# Patient Record
Sex: MALE | Race: WHITE | HISPANIC OR LATINO
[De-identification: names, ages, dates, MRNs, and addresses within clinical notes are randomized per-mention and may not be internally consistent; named-entity substitution may affect disease eponyms.]

---

## 2018-01-18 PROBLEM — Z00.00 ENCOUNTER FOR PREVENTIVE HEALTH EXAMINATION: Status: ACTIVE | Noted: 2018-01-18

## 2018-03-16 ENCOUNTER — APPOINTMENT (OUTPATIENT)
Dept: VASCULAR SURGERY | Facility: CLINIC | Age: 73
End: 2018-03-16
Payer: MEDICARE

## 2018-03-16 VITALS
OXYGEN SATURATION: 100 % | WEIGHT: 195 LBS | TEMPERATURE: 97.7 F | DIASTOLIC BLOOD PRESSURE: 81 MMHG | HEIGHT: 67 IN | BODY MASS INDEX: 30.61 KG/M2 | SYSTOLIC BLOOD PRESSURE: 130 MMHG | HEART RATE: 69 BPM

## 2018-03-16 DIAGNOSIS — Z83.3 FAMILY HISTORY OF DIABETES MELLITUS: ICD-10-CM

## 2018-03-16 DIAGNOSIS — Z78.9 OTHER SPECIFIED HEALTH STATUS: ICD-10-CM

## 2018-03-16 DIAGNOSIS — Z87.891 PERSONAL HISTORY OF NICOTINE DEPENDENCE: ICD-10-CM

## 2018-03-16 DIAGNOSIS — Z86.79 PERSONAL HISTORY OF OTHER DISEASES OF THE CIRCULATORY SYSTEM: ICD-10-CM

## 2018-03-16 DIAGNOSIS — I83.90 ASYMPTOMATIC VARICOSE VEINS OF UNSPECIFIED LOWER EXTREMITY: ICD-10-CM

## 2018-03-16 DIAGNOSIS — Z86.39 PERSONAL HISTORY OF OTHER ENDOCRINE, NUTRITIONAL AND METABOLIC DISEASE: ICD-10-CM

## 2018-03-16 PROCEDURE — 99204 OFFICE O/P NEW MOD 45 MIN: CPT

## 2018-03-16 RX ORDER — METFORMIN HYDROCHLORIDE 500 MG/1
500 TABLET, COATED ORAL
Refills: 0 | Status: ACTIVE | COMMUNITY

## 2018-03-16 RX ORDER — SAXAGLIPTIN 5 MG/1
TABLET, FILM COATED ORAL
Refills: 0 | Status: ACTIVE | COMMUNITY

## 2018-06-15 ENCOUNTER — APPOINTMENT (OUTPATIENT)
Dept: VASCULAR SURGERY | Facility: CLINIC | Age: 73
End: 2018-06-15

## 2018-09-19 ENCOUNTER — APPOINTMENT (OUTPATIENT)
Dept: ORTHOPEDIC SURGERY | Facility: CLINIC | Age: 73
End: 2018-09-19

## 2020-01-24 ENCOUNTER — APPOINTMENT (OUTPATIENT)
Dept: ORTHOPEDIC SURGERY | Facility: CLINIC | Age: 75
End: 2020-01-24
Payer: MEDICARE

## 2020-01-24 VITALS
WEIGHT: 190 LBS | SYSTOLIC BLOOD PRESSURE: 144 MMHG | BODY MASS INDEX: 29.82 KG/M2 | HEART RATE: 77 BPM | DIASTOLIC BLOOD PRESSURE: 82 MMHG | HEIGHT: 67 IN

## 2020-01-24 PROCEDURE — 20611 DRAIN/INJ JOINT/BURSA W/US: CPT | Mod: LT

## 2020-01-24 PROCEDURE — 99203 OFFICE O/P NEW LOW 30 MIN: CPT | Mod: 25

## 2020-01-24 PROCEDURE — 73564 X-RAY EXAM KNEE 4 OR MORE: CPT | Mod: LT

## 2020-01-24 RX ORDER — HYALURONATE SOD, CROSS-LINKED 30 MG/3 ML
30 SYRINGE (ML) INTRAARTICULAR
Qty: 1 | Refills: 0 | Status: ACTIVE | COMMUNITY
Start: 2020-01-24 | End: 1900-01-01

## 2020-01-24 NOTE — PHYSICAL EXAM
[de-identified] : Physical Examination\par General: well nourished, in no acute distress, alert and oriented to person, place and time\par Psychiatric: normal mood and affect, no abnormal movements or speech patterns\par Eyes: vision intact - glasses\par Throat: no thyromegaly\par Lymph: no enlarged nodes, no lymphedema in extremity\par Respiratory: no wheezing, no shortness of breath with ambulation\par Cardiac: no cardiac leg swelling, 2+ peripheral pulses\par Neurology: normal gross sensation in extremities to light touch\par Abdomen: soft, non-tender, tympanic, no masses\par \par Musculoskeletal Examination\par Ambulation	+ antalgic gait, + assistive devices\par \par Knee			Right			Left\par General\par      Swelling/Deformity	normal			normal	\par      Skin			varicose veins			varicose veins\par      Erythema		-			-\par      Standing Alignment	neutral			varus mild correctable\par      Effusion		none			trace\par Range of Motion\par      Hip			full painless ROM		full painless ROM\par      Knee Flexion		120			110\par      Knee Extension	0			0\par Patella\par      J Sign		-			-\par      Quad Medial/Lateral	1/1 1/1\par      Apprehension		-			-\par      Surjit's		-			+\par      Grind Sign		-			+\par      Crepitus		-			+\par Palpation\par      Medial Joint Line	-			+\par      Medial Fem Condyle	-			-\par      Lateral Joint Line	-			-\par      Quad Tendon		-			-\par      Patella Tendon	-			-\par      Medial Patella		-			-\par      Lateral Patella 	-			-\par      Posterior Knee	-			+\par Ligamentous\par      Varus @ 0° / 30°	-/-			-/-\par      Valgus @ 0° / 30°	-/-			-/-\par Strength Examination/Atrophy\par      Hip Flexors 		5+			5+\par      Quadriceps		5+			5+\par      Hamstring		5+			5+\par      Tibialis Anterior	5+			5+\par      Achilles/Soleus	5+			5+\par Sensation\par      Deep Peroneal	normal			normal\par      Superficial Peroneal 	normal			normal\par      Sural  		normal			normal\par      Posterior Tibial 	normal			normal\par      Saphneous 		normal			normal\par Pulses\par      DP			2+			2+\par  [de-identified] : 5 views of the affected left knee (standing AP, flexing standing AP, 30degree flexed lateral, 0degree lateral, sunrise view)\par were ordered, obtained and evaluated by myself today and\par demonstrate:\par There is severe medial weightbearing asymmetric narrowing\par Small osteophytic lipping\par Trace suprapatellar effusion\par Mild lateral patellofemoral joint space loss without evidence of tilt [or] subluxation on sunrise view\par Normal soft tissue density\par Otherwise normal osseous bone structure without fracture or dislocation

## 2020-01-24 NOTE — HISTORY OF PRESENT ILLNESS
[de-identified] : CC Left knee\par \par HPI 75 yo male presents with [chronic] onset of many years of activity related pain in the medial Left knee [without injury]. The pain is [worse], and rated a 8 out of 10, described as pain, [without radiation]. [Rest] makes the pain better and [walking standing stairs] makes the pain worse. The patient reports associated symptoms of pain. The patient - pain at night affecting sleep, and + similar pain previously treated with gel injections until the DrStefano stopped given them.\par \par The patient has tried the following treatments:\par Activity modification	+\par Ice/Compression  	+\par Braces    		+\par Nsaids    		+\par Physical Therapy 	-\par Cortisone Injection	+ no improvement\par Visco Injection		+ some improvement last injection 3 years and\par Arthroscopy		-\par \par Review of Systems is positive for the above musculoskeletal symptoms and is otherwise non-contributory for general, constitutional, psychiatric, neurologic, HEENT, cardiac, respiratory, gastrointestinal, reproductive, lymphatic, and dermatologic complaints.\par \par Consult by Dr Horton\par \par

## 2020-01-24 NOTE — DISCUSSION/SUMMARY
[de-identified] : Left knee osteoarthritis\par \par \par Patient is not interested in surgical management\par The patient and I discussed the causes and progression of degenerative joint disease of the knee. Models, diagrams and drawings were used in the discussion. Treatment can include conservative non-operative management and surgical options. Conservative management includes weight loss, activity modification, physical therapy to improve motion and strength in the muscles around the knee and the body's core, PO and topical NSAIDs, corticosteroid and/or viscosupplementation intra-articular injections. If the patient fails to improve with non-operative management, surgical management is possible. Depending upon the patient's age, BMI, activity level, degree and location of arthrosis different surgical options are possible including arthroscopic debridement with chondroplasty, high-tibial osteotomy, unicondylar/partial arthroplasty, and total joint arthroplasty.\par \par Patient declined physical therapy\par Patient declined topical anti-inflammatory cream\par Patient is uninterested in surgical management\par \par Procedure Note:\par \par Verbal consent was obtained for an arthrocentesis and intra-articular ketorolac injection of the Left knee, after the risks and benefits were discussed with the patient. Potential adverse effects were discussed including but not limited to bleeding, skin/joint infection, local skin reactions including bleaching, bruising, stiffness, soreness, vasovagal episodes, transient hyperglycemia, avascular necrosis, pseudo-septic type reactions, post injection joint pain, allergic reaction to product or anesthetic and other rare but potential adverse effects along with benefits including decreased pain and improved stability prior to obtaining verbal informed consent. It was also discussed that for some patients the treatment is ineffective and there are no guarantees that the patient will experience improvement as the result of the injection. In rare occasions the injection can cause worsening of pain.\par \par The use of a Sonosite 15-6 MHz linear transducer with live ultrasound guidance of the knee was necessary given the patient's BMI and local body habitus overlying and obscuring the accurate identification of normal body bony anatomy used to identify the injection site and the depth of soft tissue envelope necessitating a longer than normal needle to reach the joint space, and to confirm the location of the needle tip and intra-articular delivery of the medication. Without the use of live ultrasound guidance the injection would have been more difficult and place the patient's neurovascular structures at risk from the longer needle needed to traverse the soft tissue envelope.\par \par A 6 inch bolster was placed underneath the knee to place the knee in a slightly flexed position. The superolateral injection site was identified using the ultrasound probe to identify the suprapatellar space and superior boarder of the patella first longitudinally and then transversely. The injection site was marked and prepped with a ChloraPrep swab and anesthetized with ethylchloride skin anesthesia. Using sterile technique a 20g 3 1/2 in spinal needle with 4 cc total of 1cc 1% lidocaine without epinephrine, 1cc 0.25% Marcaine without epinephrine and 2cc of 60mg/mL Ketorolac was passed through the injection site towards the suprapatellar space under live ultrasound guidance and noted to penetrate the joint capsule. The medication was injected without resistance under live ultrasound visualization and noted to flow into the suprapatellar joint space. The injection site was sterilely dressed, there was minimal blood loss. The patient tolerated this procedure without any complications done by myself. Images were recorded and saved.\par \par The patient has been advised that if they notice any worsening of symptoms or any problems to contact me and seek care from a qualified medical professional. The patient was instructed to ice the knee and take NSAID medication on an as needed basis if the patient feels discomfort.\par \par Due to failure of prior non-operative modalities of treatment including physical therapy, activity modification, non-steroidal anti-inflammatory medications, and weight-loss with inability to give corticosteroid injection due to the patient's diabetic status over concern for unsafe rise in blood glucose causing injury to the patient, I am ordering a viscosupplementation injection Gel 1 for the left knee and will obtain insurance authorization. The patient will be contacted by our authorization department over its status, copayment and when available for injection.\par \par The patient verifies their understanding the the visit, diagnosis and plan. They agree with the treatment plan and will contact the office with any questions or problems.\par Follow up\par PRN

## 2022-04-26 ENCOUNTER — APPOINTMENT (OUTPATIENT)
Dept: ORTHOPEDIC SURGERY | Facility: CLINIC | Age: 77
End: 2022-04-26

## 2022-04-29 ENCOUNTER — APPOINTMENT (OUTPATIENT)
Dept: ORTHOPEDIC SURGERY | Facility: CLINIC | Age: 77
End: 2022-04-29
Payer: MEDICARE

## 2022-04-29 ENCOUNTER — NON-APPOINTMENT (OUTPATIENT)
Age: 77
End: 2022-04-29

## 2022-04-29 VITALS — WEIGHT: 195 LBS | BODY MASS INDEX: 30.61 KG/M2 | HEIGHT: 67 IN

## 2022-04-29 PROCEDURE — 20610 DRAIN/INJ JOINT/BURSA W/O US: CPT | Mod: LT

## 2022-04-29 PROCEDURE — 99214 OFFICE O/P EST MOD 30 MIN: CPT | Mod: 25

## 2022-04-29 RX ORDER — HYALURONATE SOD, CROSS-LINKED 30 MG/3 ML
30 SYRINGE (ML) INTRAARTICULAR
Qty: 1 | Refills: 0 | Status: ACTIVE | COMMUNITY
Start: 2022-04-29

## 2022-04-29 NOTE — HISTORY OF PRESENT ILLNESS
[de-identified] : Patient is here for left knee pain. This has been a chronic issue. He states that he had arthroscopic surgery on it about 5 years ago. He was treated with HA injections i the pat which provided relief, that was about 3 years ago. There has been no recent injury. He was seen by his PCP who sent him for xrays and referred him here. He has been to PT. He takes ASA occasionally for pain. He is retired. \par \par The patient's past medical history, past surgical history, medications and allergies were reviewed by me today and documented accordingly. In addition, the patient's family and social history, which were noncontributory to this visit, were reviewed also. Intake form was reviewed. The patient has no family history of arthritis.

## 2022-04-29 NOTE — PHYSICAL EXAM
[de-identified] : Constitutional: Well-nourished, well-developed, No acute distress\par Respiratory:  Good respiratory effort, no SOB\par Lymphatic: No regional lymphadenopathy, no lymphedema\par Psychiatric: Pleasant and normal affect, alert and oriented x3\par Musculoskeletal: normal except where as noted in regional exam\par \par Bilateral knees:\par APPEARANCE: + enlargement of the distal femur and proximal tibia, varus deformity, no swelling\par POSITIVE TENDERNESS:  Distal femur, proximal tibia, medial jt line/retinaculum, and lateral jt line/retinaculum\par NONTENDER: patellar & quadriceps tendons, MCL/LCL, ITB at the lateral femoral condyle & Gerdy's tubercle, pes bursa. \par ROM: full extension, limited flexion to 115° due to stiffness and pain. \par RESISTIVE TESTING: painless resisted knee flex/ext, although + crepitus felt in anterior knee. \par SPECIAL TESTS: stable v/v stress. painless grind. neg ant/post drawer. + Sandeep's for pain in medial and lateral joint line. \par  [de-identified] : I reviewed, interpreted and clinically correlated the following outside imaging studies,\par Outside x-rays, 3 views of the bilateral knees, evidence of severe tricompartmental osteoarthritis, with near bone-on-bone contact of the medial compartment of the right knee, and bone-on-bone contact of the medial compartment of the left knee.\par _____________\par \par Date of Exam: 04-\par  \par EXAM:  X-RAY BILATERAL KNEES 3 VIEWS EACH\par \par HISTORY:  Bilateral knee pain with no trauma\par \par TECHNIQUE: 3 radiographic views of the bilateral knee were obtained.\par \par COMPARISON:  Correlation made with left knee x-ray dated 12/26/2019.\par \par FINDINGS:  \par Right knee demonstrates tricompartment osteoarthritis most severe medially. Subchondral sclerosis and osteophyte formation. No erosive changes. Small suprapatellar enthesophyte. Small effusion and extensive vascular disease.\par \par Left knee demonstrates severe degenerative narrowing with bone-on-bone medially. Moderate degenerative changes involving the patellofemoral joint and mild degenerative changes involving the lateral joint space. Subchondral sclerosis and marginal osteophytes. Small effusion. Diffuse vascular disease.\par \par \par \par IMPRESSION: \par 1. Bilateral osteoarthritis greater on left than right with progression.

## 2022-04-29 NOTE — DISCUSSION/SUMMARY
[de-identified] : Discussed findings of today's exam and possible causes of patient's pain.  Educated patient on their most probable diagnosis of chronic intermittent left knee pain with recent atraumatic exacerbation due to underlying severe osteoarthritis.  Reviewed possible courses of treatment, and we collaboratively decided best course of treatment at this time will include conservative management.  I reviewed the patient's outpatient x-rays which shows he has severe osteoarthritis, it is more significant in the left knee than the right knee, he has bone-on-bone contact of the medial compartment of the left knee.  His left knee is a symptomatic knee at this time, he is not having any pain in the right knee currently.  We discussed various treatment options as well as associated risk/benefits/alternatives and patient elected to proceed with left knee cortisone injection today (see procedure note).  Informed the patient that the numbing medicine in today's injection will last for about 4-6 hours. The steroid that was injected will start to work in 1 to 2 days, peak at 1-2 weeks, and may last up to 1-2 months.  Patient will be started on a course of physical therapy to restore normal range of motion and strength as tolerated.  We also discussed utilization of hyaluronic acid injection therapy as a potentially more long-term and beneficial treatment option, patient would like to proceed with insurance authorization at this time.  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  Patient appreciates and agrees with current plan.\par \par I work as part of an academic orthopedic group and routinely have a physician in training (resident / fellow) working with me.  Any part of the history and physical exam performed by the physician in training was either directly reviewed and/or replicated by myself.  Any procedure performed by the physician in training was performed under my direct supervision and with the consent of the patient.\par \par This note was generated using dragon medical dictation software.  A reasonable effort has been made for proofreading its contents, but typos may still remain.  If there are any questions or points of clarification needed please notify my office.

## 2022-04-29 NOTE — CONSULT LETTER
[Dear  ___] : Dear  [unfilled], [Consult Letter:] : I had the pleasure of evaluating your patient, [unfilled]. [Please see my note below.] : Please see my note below. [Consult Closing:] : Thank you very much for allowing me to participate in the care of this patient.  If you have any questions, please do not hesitate to contact me. [Sincerely,] : Sincerely, [FreeTextEntry2] : [id number] [place] [address] [FreeTextEntry3] : Tariq Manley DO, ATC\par Primary Care Sports Medicine\par [facility] [place]

## 2022-04-29 NOTE — PROCEDURE
[de-identified] : Injection: Left knee joint.\par Indication: Osteoarthritis.\par \par A discussion was had with the patient regarding this procedure and all questions were answered. All risks, benefits and alternatives were discussed. These included but were not limited to bleeding, infection, allergic reaction and reaccumulation of fluid. A timeout was done to ensure correct side and patient agreed to the procedure.  A [place] yimi was created on the skin utilizing a plastic needle cap to yimi the anticipated point of entry.  Alcohol was used to clean the skin, and betadine was used to sterilize and prep the area in the lateral joint line aspect of the knee. Ethyl chloride spray was then used as a topical anesthetic. A 22-gauge needle was used to inject 2cc of 0.25% bupivacaine and 1cc of 40mg/ml methylprednisolone into the knee. A sterile bandage was then applied. The patient tolerated the procedure well.

## 2022-05-02 ENCOUNTER — FORM ENCOUNTER (OUTPATIENT)
Age: 77
End: 2022-05-02

## 2022-06-10 ENCOUNTER — APPOINTMENT (OUTPATIENT)
Dept: ORTHOPEDIC SURGERY | Facility: CLINIC | Age: 77
End: 2022-06-10
Payer: MEDICARE

## 2022-06-10 PROCEDURE — 20610 DRAIN/INJ JOINT/BURSA W/O US: CPT | Mod: LT

## 2022-06-10 NOTE — PHYSICAL EXAM
[de-identified] : Constitutional: Well-nourished, well-developed, No acute distress\par Respiratory: Good respiratory effort, no SOB\par Lymphatic: No regional lymphadenopathy, no lymphedema\par Psychiatric: Pleasant and normal affect, alert and oriented x3\par Musculoskeletal: normal except where as noted in regional exam\par \par Bilateral knees:\par APPEARANCE: + enlargement of the distal femur and proximal tibia, varus deformity, no swelling\par POSITIVE TENDERNESS: Distal femur, proximal tibia, medial jt line/retinaculum, and lateral jt line/retinaculum\par NONTENDER: patellar & quadriceps tendons, MCL/LCL, ITB at the lateral femoral condyle & Gerdy's tubercle, pes bursa. \par ROM: full extension, limited flexion to 115° due to stiffness and pain. \par RESISTIVE TESTING: painless resisted knee flex/ext, although + crepitus felt in anterior knee. \par SPECIAL TESTS: stable v/v stress. painless grind. neg ant/post drawer. + Sandeep's for pain in medial and lateral joint line. \par

## 2022-06-10 NOTE — DISCUSSION/SUMMARY
[de-identified] : Patient was seen today for continued management of chronic knee pain secondary to underlying osteoarthritis. This has been a chronic issue for the patient with recent atraumatic exacerbation. We discussed various treatment options as well as associated risk/benefits/alternatives and patient elected to proceed with hyaluronic acid injection therapy. \par A single Gel One injection was given today under sterile conditions into the left knee joint without complication (see procedure note). I discussed the effects of this medication and how long it may provide benefit. If no significant long-term benefit, the patient may elect for additional treatment strategies as previously discussed. However, if the patient obtains good relief of symptoms, the injection therapy series can be repeated over the next 6-12 months.\par \par Will have the patient followup on an as-needed basis at this time.  Patient appreciates and agrees with current plan.\par \par I work as part of an academic orthopedic group and routinely have a physician in training (resident / fellow) working with me.  Any part of the history and physical exam performed by the physician in training was either directly reviewed and/or replicated by myself.  Any procedure performed by the physician in training was performed under my direct supervision and with the consent of the patient.\par \par This note was generated using dragon medical dictation software. A reasonable effort has been made for proofreading its contents, but typos may still remain. If there are any questions or points of clarification needed please notify my office.

## 2022-06-10 NOTE — HISTORY OF PRESENT ILLNESS
[de-identified] : Patient has a history of knee osteoarthritis.  We discussed treatment options and have obtained insurance authorization for a hyaluronic acid injection and patient would like to proceed at this time.  No significant interval change in knee pain since last evaluated.

## 2022-06-10 NOTE — PROCEDURE
[de-identified] : Injection: Left knee joint.\par Indication: Osteoarthritis.\par \par A discussion was had with the patient regarding this procedure and all questions were answered. All risks, benefits and alternatives were discussed. These included but were not limited to bleeding, infection, and allergic reaction. A timeout was done to ensure correct side and patient agreed to the procedure.  A [place] yimi was created on the skin utilizing a plastic needle cap to yimi the anticipated point of entry. Alcohol was used to clean the skin, and betadine was used to sterilize and prep the area in the lateral joint line aspect of the knee. Ethyl chloride spray was then used as a topical anesthetic. A 20-gauge needle was used to inject 3 cc of Gel One into the knee with ease. A sterile bandage was then applied. The patient tolerated the procedure well and there were no complications. \par \par Lot #:  [id number]\par Exp:  8/30/23\par

## 2022-08-23 ENCOUNTER — APPOINTMENT (OUTPATIENT)
Dept: GASTROENTEROLOGY | Facility: CLINIC | Age: 77
End: 2022-08-23

## 2022-08-23 VITALS
TEMPERATURE: 97.4 F | SYSTOLIC BLOOD PRESSURE: 157 MMHG | OXYGEN SATURATION: 97 % | BODY MASS INDEX: 29.35 KG/M2 | HEIGHT: 67 IN | DIASTOLIC BLOOD PRESSURE: 73 MMHG | HEART RATE: 87 BPM | WEIGHT: 187 LBS

## 2022-08-23 DIAGNOSIS — K62.5 HEMORRHAGE OF ANUS AND RECTUM: ICD-10-CM

## 2022-08-23 PROCEDURE — 99203 OFFICE O/P NEW LOW 30 MIN: CPT

## 2022-08-23 RX ORDER — SODIUM SULFATE, POTASSIUM SULFATE, MAGNESIUM SULFATE 17.5; 3.13; 1.6 G/ML; G/ML; G/ML
17.5-3.13-1.6 SOLUTION, CONCENTRATE ORAL
Qty: 1 | Refills: 0 | Status: ACTIVE | COMMUNITY
Start: 2022-08-23 | End: 1900-01-01

## 2022-08-24 LAB — HEMOCCULT STL QL: NEGATIVE

## 2022-08-25 NOTE — ASSESSMENT
[FreeTextEntry1] : This is a 76 year old male here for an evaluation of FOBT +. \par \par Mt plan \par -EGD and colonoscopy same day \par -Suprep\par -Covid swab\par -cbc\par \par Risks, benefits, and alternatives of EGD and colonoscopy discussed at length with patient including but not limited to bleeding , perforation, anesthesia related complication, aspiration, etc. Patient expressed understanding.\par \par EGD/Colonoscopy for evaluation of polyps, tumors, nodules with +/- biopsy and or cauterization w/ and or w/o clipping. \par

## 2022-08-25 NOTE — PHYSICAL EXAM
[General Appearance - Alert] : alert [General Appearance - In No Acute Distress] : in no acute distress [Bowel Sounds] : normal bowel sounds [Abdomen Soft] : soft [Abdomen Tenderness] : non-tender [] : no hepato-splenomegaly [Abdomen Mass (___ Cm)] : no abdominal mass palpated [External Hemorrhoid] : external hemorrhoids [No CVA Tenderness] : no ~M costovertebral angle tenderness [No Spinal Tenderness] : no spinal tenderness [Abnormal Walk] : normal gait [Nail Clubbing] : no clubbing  or cyanosis of the fingernails [Musculoskeletal - Swelling] : no joint swelling seen [Motor Tone] : muscle strength and tone were normal [Deep Tendon Reflexes (DTR)] : deep tendon reflexes were 2+ and symmetric [Sensation] : the sensory exam was normal to light touch and pinprick [No Focal Deficits] : no focal deficits [Oriented To Time, Place, And Person] : oriented to person, place, and time [Impaired Insight] : insight and judgment were intact [Affect] : the affect was normal [FreeTextEntry1] : hemorrhoid at 6 O'clock

## 2022-08-25 NOTE — HISTORY OF PRESENT ILLNESS
[de-identified] : This is a 76 year old male here for an evaluation of FOBT +. PMH of DM, HTN, inguinal hernia repair. Denies a family history of colon CA, gastric CA, high risk polyps, Inflammatory and autoimmune disease. Patient denies any difficulty swallowing or reflux. No N&V or abdominal pain. Remote history of normal  EGD. Stool caliber normal. Reports BRBPR over the past several months. Not constipated. No rectal pain. Last colonoscopy 20 years ago with benign polyps. \par Smoke: quit 20 years ago\par Etoh: quit 20 years ago

## 2022-09-01 RX ORDER — POLYETHYLENE GLYCOL-3350 AND ELECTROLYTES 236; 6.74; 5.86; 2.97; 22.74 G/274.31G; G/274.31G; G/274.31G; G/274.31G; G/274.31G
236 POWDER, FOR SOLUTION ORAL
Qty: 1 | Refills: 0 | Status: ACTIVE | COMMUNITY
Start: 2022-09-01 | End: 1900-01-01

## 2022-09-12 ENCOUNTER — APPOINTMENT (OUTPATIENT)
Dept: GASTROENTEROLOGY | Facility: HOSPITAL | Age: 77
End: 2022-09-12

## 2022-09-12 ENCOUNTER — OUTPATIENT (OUTPATIENT)
Dept: OUTPATIENT SERVICES | Facility: HOSPITAL | Age: 77
LOS: 1 days | End: 2022-09-12
Payer: COMMERCIAL

## 2022-09-12 ENCOUNTER — RESULT REVIEW (OUTPATIENT)
Age: 77
End: 2022-09-12

## 2022-09-12 VITALS
DIASTOLIC BLOOD PRESSURE: 69 MMHG | OXYGEN SATURATION: 97 % | HEART RATE: 69 BPM | SYSTOLIC BLOOD PRESSURE: 130 MMHG | RESPIRATION RATE: 16 BRPM

## 2022-09-12 VITALS
SYSTOLIC BLOOD PRESSURE: 133 MMHG | HEIGHT: 67 IN | RESPIRATION RATE: 19 BRPM | WEIGHT: 190.04 LBS | OXYGEN SATURATION: 99 % | DIASTOLIC BLOOD PRESSURE: 67 MMHG | HEART RATE: 81 BPM | TEMPERATURE: 98 F

## 2022-09-12 DIAGNOSIS — Z98.890 OTHER SPECIFIED POSTPROCEDURAL STATES: Chronic | ICD-10-CM

## 2022-09-12 DIAGNOSIS — K62.5 HEMORRHAGE OF ANUS AND RECTUM: ICD-10-CM

## 2022-09-12 LAB — SARS-COV-2 N GENE NPH QL NAA+PROBE: NOT DETECTED

## 2022-09-12 PROCEDURE — 45385 COLONOSCOPY W/LESION REMOVAL: CPT

## 2022-09-12 PROCEDURE — C1889: CPT

## 2022-09-12 PROCEDURE — 43239 EGD BIOPSY SINGLE/MULTIPLE: CPT

## 2022-09-12 PROCEDURE — 88305 TISSUE EXAM BY PATHOLOGIST: CPT

## 2022-09-12 PROCEDURE — 88312 SPECIAL STAINS GROUP 1: CPT | Mod: 26

## 2022-09-12 PROCEDURE — 88312 SPECIAL STAINS GROUP 1: CPT

## 2022-09-12 PROCEDURE — 45380 COLONOSCOPY AND BIOPSY: CPT | Mod: 59

## 2022-09-12 PROCEDURE — 88305 TISSUE EXAM BY PATHOLOGIST: CPT | Mod: 26

## 2022-09-12 RX ORDER — SODIUM CHLORIDE 9 MG/ML
500 INJECTION INTRAMUSCULAR; INTRAVENOUS; SUBCUTANEOUS
Refills: 0 | Status: DISCONTINUED | OUTPATIENT
Start: 2022-09-12 | End: 2022-09-26

## 2022-09-12 NOTE — ASU PATIENT PROFILE, ADULT - FALL HARM RISK - UNIVERSAL INTERVENTIONS
Bed in lowest position, wheels locked, appropriate side rails in place/Call bell, personal items and telephone in reach/Instruct patient to call for assistance before getting out of bed or chair/Non-slip footwear when patient is out of bed/[place] to call system/Physically safe environment - no spills, clutter or unnecessary equipment/Purposeful Proactive Rounding/Room/bathroom lighting operational, light cord in reach

## 2022-09-12 NOTE — ASU PATIENT PROFILE, ADULT - PRESSURE ULCER(S)
Subjective:    Patient ID:  Anju Rivera is a 89 y.o. female who presents for evaluation of Hypertension, Hyperlipidemia, Coronary Artery Disease, Congestive Heart Failure, and Shortness of Breath        HPI Pt returns for f/u.  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   Negative stress mpi 2/16.  Echo 2/16 showed normal LVEF, mild AS, mild PHTN.   She saw Dr. Jackson, [facility] surgery, 2019 for her PAD, with med mgt advised at her advanced frail age.  Echo 9/18 normal LV function, LVH, mild-mod AS.  ecg 9/28/20 NSR, LVH.  No acute changes.  Now here.  Pt recently put on short course of Lasix by PCP for dyspnea, CHF.  She called requesting refill.  BNP was much higher than past values.  BNP 1123 on 12/1/20 lab.  She is weak, frail at her advanced age and on home O2 for years.  Has fall risk issues.  Has chronic dyspnea but gets worse at times.  Has chronic cough.  Uses her inhalers.  S/p doxycycline course few days ago.  BP elevated.  No typical angina.  Has some atypical cp with deep breathing.   No foot ulcers.  F/u by digital HTN program.   CXR 12/1/20 small pleural effusions.        Current Outpatient Medications:     albuterol (VENTOLIN HFA) 90 mcg/actuation inhaler, Inhale 2 puffs into the lungs every 4 (four) hours as needed for Wheezing., Disp: 18 g, Rfl: 3    albuterol-ipratropium (DUO-NEB) 2.5 mg-0.5 mg/3 mL nebulizer solution, USE 1 VIAL VIA NEBULIZER EVERY 6 TO 8 HOURS AS NEEDED FOR WHEEZING, Disp: 360 mL, Rfl: 6    allopurinoL (ZYLOPRIM) 100 MG tablet, Take 2 tablets (200 mg total) by mouth once daily., Disp: 180 tablet, Rfl: 1    aspirin (ECOTRIN) 81 MG EC tablet, Take 81 mg by mouth once daily., Disp: , Rfl:     bimatoprost (LUMIGAN) 0.03 % ophthalmic  drops, Place 1 drop into both eyes nightly., Disp: 2.5 mL, Rfl: 12    bisacodyl (DULCOLAX) 5 mg EC tablet, Take 5 mg by mouth daily as needed for Constipation., Disp: , Rfl:     CARBOXYMETHYLCELLULOSE SODIUM (REFRESH OPHT), Apply 1 drop to eye daily as needed. , Disp: , Rfl:     citalopram (CELEXA) 40 MG tablet, TAKE ONE TABLET BY MOUTH ONE TIME DAILY, Disp: 90 tablet, Rfl: 2    clonazePAM (KLONOPIN) 0.5 MG tablet, Take 1 tablet (0.5 mg total) by mouth 3 (three) times daily., Disp: 90 tablet, Rfl: 1    doxycycline (VIBRA-TABS) 100 MG tablet, Take 1 tablet (100 mg total) by mouth 2 (two) times daily. for 14 days, Disp: 28 tablet, Rfl: 0    facial-body wipes (CLEANSING EYELID WIPES TOP), , Disp: , Rfl:     ferrous sulfate (IRON, FERROUS SULFATE,) 325 mg (65 mg iron) Tab tablet, , Disp: , Rfl:     fluticasone furoate-vilanterol (BREO ELLIPTA) 100-25 mcg/dose diskus inhaler, Inhale 1 puff into the lungs once daily., Disp: 1 each, Rfl: 11    furosemide (LASIX) 20 MG tablet, Take 1 tablet (20 mg total) by mouth once daily., Disp: 30 tablet, Rfl: 12    HYDROcodone-acetaminophen (NORCO) 5-325 mg per tablet, Take 1/2 to 1 tablet one time during the the day as needed and 1 1/2 tablet at night, Disp: 75 tablet, Rfl: 0    levothyroxine (SYNTHROID) 50 MCG tablet, Take 1 tablet (50 mcg total) by mouth once daily., Disp: 90 tablet, Rfl: 1    losartan (COZAAR) 50 MG tablet, Take 1 tablet (50 mg total) by mouth once daily., Disp: 90 tablet, Rfl: 4    LUMIGAN 0.01 % Drop, Place 1 drop into both eyes every evening., Disp: 2.5 mL, Rfl: 11    MULTIVITAMIN, Take 1 tablet by mouth Daily., Disp: , Rfl:     OXYGEN-AIR DELIVERY SYSTEMS MISC, 2 L by Misc.(Non-Drug; Combo Route) route. Per min, Disp: , Rfl:     potassium chloride SA (K-DUR,KLOR-CON) 10 MEQ tablet, Take 1 tablet (10 mEq total) by mouth once daily., Disp: 30 tablet, Rfl: 12    psyllium (METAMUCIL) powder, Take 1 packet by mouth once daily., Disp: , Rfl:      "UNKNOWN TO PATIENT, Cough syrup as needed, Disp: , Rfl:       Review of Systems   Constitution: Positive for malaise/fatigue.   HENT: Negative.    Eyes: Negative.    Cardiovascular: Positive for chest pain, dyspnea on exertion and leg swelling.   Respiratory: Positive for cough and shortness of breath.    Endocrine: Negative.    Hematologic/Lymphatic: Negative.    Skin: Negative.    Musculoskeletal: Positive for arthritis.   Gastrointestinal: Negative.    Genitourinary: Negative.    Neurological: Positive for weakness.   Psychiatric/Behavioral: Negative.    Allergic/Immunologic: Negative.        BP (!) 170/90 (BP Location: Left arm, Patient Position: Sitting, BP Method: Medium (Manual))   Pulse 69   Ht 5' 3" (1.6 m)   SpO2 98%   BMI 14.70 kg/m²     Wt Readings from Last 3 Encounters:   12/10/20 37.6 kg (83 lb)   12/01/20 38.1 kg (83 lb 15.9 oz)   11/24/20 36 kg (79 lb 5.9 oz)     Temp Readings from Last 3 Encounters:   12/10/20 98.8 °F (37.1 °C) (Temporal)   11/24/20 100 °F (37.8 °C) (Tympanic)   09/18/20 99.1 °F (37.3 °C) (Tympanic)     BP Readings from Last 3 Encounters:   12/14/20 (!) 170/90   12/10/20 (!) 162/82   12/01/20 (!) 160/80     Pulse Readings from Last 3 Encounters:   12/14/20 69   12/10/20 78   12/01/20 75          Objective:    Physical Exam   Constitutional: She is oriented to person, place, and time. Vital signs are normal. She appears well-developed. She appears cachectic. She is cooperative. She has a sickly appearance. She appears ill. No distress.   HENT:   Head: Normocephalic.   Neck: Neck supple. No JVD present. Carotid bruit is not present. No thyromegaly present.   Cardiovascular: Normal rate, regular rhythm, S1 normal, S2 normal and normal pulses. PMI is not displaced. Exam reveals no gallop and no friction rub.   Murmur heard.   Medium-pitched midsystolic murmur is present at the lower left sternal border.  Pulses:       Radial pulses are 2+ on the right side and 2+ on the left side. "   Pulmonary/Chest: Effort normal. She has no wheezes. She has rhonchi. She has no rales.   Abdominal: Soft. Normal appearance and bowel sounds are normal. She exhibits no abdominal bruit. There is no abdominal tenderness.   Musculoskeletal:         General: Edema present.   Lymphadenopathy:     She has no cervical adenopathy.   Neurological: She is alert and oriented to person, place, and time.   Skin: Skin is warm. She is not diaphoretic.   Psychiatric: She has a normal mood and affect. Her behavior is normal.   Nursing note and vitals reviewed.      I have reviewed all pertinent labs and cardiac studies.      Component      Latest Ref Rng & Units 12/1/2020   BNP      0 - 99 pg/mL 1,123 (H)         Chemistry        Component Value Date/Time     09/28/2020 1142    K 4.4 09/28/2020 1142     09/28/2020 1142    CO2 25 09/28/2020 1142    BUN 44 (H) 09/28/2020 1142    CREATININE 1.4 09/28/2020 1142    GLU 57 (L) 09/28/2020 1142        Component Value Date/Time    CALCIUM 9.2 09/28/2020 1142    ALKPHOS 76 09/28/2020 1142    AST 26 09/28/2020 1142    ALT 13 09/28/2020 1142    BILITOT 0.2 09/28/2020 1142    ESTGFRAFRICA 37.9 (A) 09/28/2020 1142    EGFRNONAA 32.9 (A) 09/28/2020 1142        Lab Results   Component Value Date    WBC 6.03 12/07/2020    HGB 9.4 (L) 12/07/2020    HCT 31.6 (L) 12/07/2020     (H) 12/07/2020     12/07/2020       Lab Results   Component Value Date    HGBA1C 5.5 03/17/2008     Lab Results   Component Value Date    CHOL 190 04/29/2019    CHOL 189 04/17/2018    CHOL 179 08/01/2016     Lab Results   Component Value Date    HDL 84 (H) 04/29/2019    HDL 80 (H) 04/17/2018    HDL 61 08/01/2016     Lab Results   Component Value Date    LDLCALC 98.2 04/29/2019    LDLCALC 100.6 04/17/2018    LDLCALC 96.2 08/01/2016     Lab Results   Component Value Date    TRIG 39 04/29/2019    TRIG 42 04/17/2018    TRIG 109 08/01/2016     Lab Results   Component Value Date    CHOLHDL 44.2 04/29/2019     CHOLHDL 42.3 04/17/2018    CHOLHDL 34.1 08/01/2016           Assessment:       1. Chronic diastolic heart failure    2. Abnormal ECG    3. Aortic valve disorder    4. Bilateral carotid artery stenosis    5. Coronary artery disease of native artery of native heart with stable angina pectoris    6. Essential hypertension    7. HX of MI    8. Nonrheumatic mitral valve regurgitation    9. Mixed hyperlipidemia    10. Nonrheumatic aortic valve stenosis    11. Peripheral vascular disease    12. Pulmonary hypertension    13. PVC's (premature ventricular contractions)    14. Shortness of breath    15. Pulmonary emphysema, unspecified emphysema type    16. Acute heart failure, unspecified heart failure type         Plan:             Worsening diastolic CHF sxs.  Add back Lasix 20 mg qd.  Potassium chloride 10 meq qd.  CMP today or tomorrow.  Repeat CMP + BNP 2 weeks.  Cardiac low salt diet.  Med tx for CAD/PAD.  Frail condition -- not candidate for invasive cardiac workup.  Conservative med mgt advised.  Continue home O2/pulmonary f/u.  Echocardiogram.  Phone review.  F/u 6 weeks.          no

## 2022-09-14 LAB — SURGICAL PATHOLOGY STUDY: SIGNIFICANT CHANGE UP

## 2022-09-28 DIAGNOSIS — A04.8 OTHER SPECIFIED BACTERIAL INTESTINAL INFECTIONS: ICD-10-CM

## 2022-09-28 PROBLEM — I10 ESSENTIAL (PRIMARY) HYPERTENSION: Chronic | Status: ACTIVE | Noted: 2022-09-12

## 2022-09-28 RX ORDER — BISMUTH SUBSALICYLATE 262 MG
262 TABLET,CHEWABLE ORAL 4 TIMES DAILY
Qty: 8 | Refills: 1 | Status: ACTIVE | COMMUNITY
Start: 2022-09-28 | End: 1900-01-01

## 2022-09-28 RX ORDER — OMEPRAZOLE 20 MG/1
20 CAPSULE, DELAYED RELEASE ORAL TWICE DAILY
Qty: 28 | Refills: 0 | Status: ACTIVE | COMMUNITY
Start: 2022-09-28 | End: 1900-01-01

## 2022-09-28 RX ORDER — DOXYCYCLINE HYCLATE 100 MG/1
100 CAPSULE ORAL
Qty: 28 | Refills: 0 | Status: ACTIVE | COMMUNITY
Start: 2022-09-28 | End: 1900-01-01

## 2022-09-28 RX ORDER — METRONIDAZOLE 250 MG/1
250 TABLET ORAL EVERY 6 HOURS
Qty: 56 | Refills: 0 | Status: ACTIVE | COMMUNITY
Start: 2022-09-28 | End: 1900-01-01

## 2022-10-28 ENCOUNTER — APPOINTMENT (OUTPATIENT)
Dept: VASCULAR SURGERY | Facility: CLINIC | Age: 77
End: 2022-10-28

## 2022-10-28 VITALS
WEIGHT: 190 LBS | DIASTOLIC BLOOD PRESSURE: 82 MMHG | HEIGHT: 67 IN | SYSTOLIC BLOOD PRESSURE: 137 MMHG | HEART RATE: 65 BPM | BODY MASS INDEX: 29.82 KG/M2

## 2022-10-28 PROCEDURE — 99204 OFFICE O/P NEW MOD 45 MIN: CPT

## 2022-10-28 NOTE — ASSESSMENT
[FreeTextEntry1] : Patient with bilateral lower extremity varicosities with 1 episode of bleeding.\par \par Both feet are well-perfused on examination but patient does not have palpable pulses and therefore we will schedule the patient for arterial Dopplers.\par \par Patient with extensive large varicosities throughout with 1 episode of bleeding.  Recommend compression stockings and elevation.  Follow-up with venous duplex.\par \par Risks benefits and alternative modes of treatment were all discussed with the patient in detail

## 2022-10-28 NOTE — CONSULT LETTER
[Dear  ___] : Dear  [unfilled], [Consult Letter:] : I had the pleasure of evaluating your patient, [unfilled]. [Please see my note below.] : Please see my note below. [Consult Closing:] : Thank you very much for allowing me to participate in the care of this patient.  If you have any questions, please do not hesitate to contact me. [Sincerely,] : Sincerely, [FreeTextEntry3] : German Bustamante M.D., F.EFREMS., R.P.[email or web address].I.\par  of Vascular Surgery\par Assistant Professor of Radiology\par Director of Endovascular Program/ Vascular Access Center\par [facility]

## 2022-10-28 NOTE — PHYSICAL EXAM
[JVD] : no jugular venous distention  [Normal Breath Sounds] : Normal breath sounds [Normal Heart Sounds] : normal heart sounds [2+] : left 2+ [0] : left 0 [Ankle Swelling (On Exam)] : not present [Varicose Veins Of Lower Extremities] : bilaterally [Ankle Swelling Bilaterally] : severe [] : bilaterally [Ankle Swelling On The Left] : moderate [Abdomen Masses] : No abdominal masses

## 2022-10-28 NOTE — HISTORY OF PRESENT ILLNESS
[FreeTextEntry1] : Patient is a 78-year-old gentleman with past medical history significant for diabetes, hypertension, severe arthritis of both knees presenting with longstanding history of bilateral lower extremity varicosities.  Patient has had worsening of the varicosities over the past few years.  Patient has been wearing compression stockings since last year.  Patient has had 1 episode of significant bleeding from one of the varicosities of the right leg.\par \par This is associated with heaviness tiredness and itchiness.  Symptoms are worse in the right lower extremity than the left.\par \par No complaint of claudication or rest pain.  No history of tissue loss.\par \par No history of coronary artery disease or stroke.\par \par Patient is a former smoker who quit smoking about 40 years ago.

## 2022-11-25 LAB — UREA BREATH TEST QL: POSITIVE

## 2022-11-25 RX ORDER — BISMUTH SUBSALICYLATE 262 MG
262 TABLET,CHEWABLE ORAL 4 TIMES DAILY
Qty: 8 | Refills: 1 | Status: ACTIVE | COMMUNITY
Start: 2022-11-25 | End: 1900-01-01

## 2022-11-25 RX ORDER — OMEPRAZOLE 20 MG/1
20 CAPSULE, DELAYED RELEASE ORAL TWICE DAILY
Qty: 28 | Refills: 0 | Status: ACTIVE | COMMUNITY
Start: 2022-11-25 | End: 1900-01-01

## 2022-11-25 RX ORDER — RIFABUTIN 150 MG/1
150 CAPSULE ORAL
Qty: 28 | Refills: 0 | Status: ACTIVE | COMMUNITY
Start: 2022-11-25 | End: 1900-01-01

## 2022-11-25 RX ORDER — DOXYCYCLINE 100 MG/1
100 CAPSULE ORAL
Qty: 28 | Refills: 0 | Status: ACTIVE | COMMUNITY
Start: 2022-11-25 | End: 1900-01-01

## 2022-12-06 ENCOUNTER — RX RENEWAL (OUTPATIENT)
Age: 77
End: 2022-12-06

## 2022-12-16 ENCOUNTER — APPOINTMENT (OUTPATIENT)
Dept: ORTHOPEDIC SURGERY | Facility: CLINIC | Age: 77
End: 2022-12-16

## 2023-02-08 LAB — UREA BREATH TEST QL: NEGATIVE

## 2023-02-22 ENCOUNTER — APPOINTMENT (OUTPATIENT)
Dept: ORTHOPEDIC SURGERY | Facility: CLINIC | Age: 78
End: 2023-02-22
Payer: MEDICARE

## 2023-02-22 VITALS — HEIGHT: 67 IN | WEIGHT: 195 LBS | BODY MASS INDEX: 30.61 KG/M2

## 2023-02-22 PROCEDURE — 99214 OFFICE O/P EST MOD 30 MIN: CPT

## 2023-02-22 PROCEDURE — 73564 X-RAY EXAM KNEE 4 OR MORE: CPT | Mod: LT

## 2023-02-22 NOTE — PHYSICAL EXAM
[de-identified] : Constitutional:Well nourished , well developed and in no acute distress\par Psychiatric: Alert and oriented to time place and person.Appropriate affect \par Skin:Head, neck, arms and lower extremities:no lesions or discoloration\par HEENT: Normocephalic, EOM intact, Nasal septum midline,\par Respiratory: Unlabored respirations,no audible wheezing ,no tachypnea, no cyanosis\par Cardiovascular: no leg swelling  no ankle edema no JVD, pulse regular\par Vascular: no calf or thigh tenderness, \par Peripheral pulses; intact\par Lymphatics:No groin adenopathy,no lymphedema lower  or upper extremities\par Left knee varicose veins pedal edema right and left ankles absent dorsal pedal posterior tibial pulses capillary refill intact range of motion 0/120 crepitus ligaments intact [de-identified] : Weightbearing x-rays left knee reveals medial compartment degenerative narrowing bone-on-bone subchondral sclerosis varus alignment lateral patellofemoral joint space narrowing and tilt

## 2023-02-22 NOTE — DISCUSSION/SUMMARY
[de-identified] : Impression end-stage osteoarthritis left knee.\par Plan patient with advanced degenerative arthritis left knee and therefore no longer a candidate for continued conservative management.  I recommended total knee replacement given patient's reported compromise of daily quality of life.  Risk benefits alternatives reviewed.  Preoperative vascular clearance to rule out peripheral vascular disease

## 2023-02-22 NOTE — HISTORY OF PRESENT ILLNESS
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  He complains of spontaneous onset of chronic intermittent pain anteromedial aspect left knee provoked by walking but reports associated locking and possible swelling.  He is walk independently.  He feels quality life is markedly compromised because of left knee symptoms.  Has a history of right knee arthroscopy over 20 years ago.

## 2023-03-09 ENCOUNTER — APPOINTMENT (OUTPATIENT)
Dept: GASTROENTEROLOGY | Facility: CLINIC | Age: 78
End: 2023-03-09
Payer: MEDICARE

## 2023-03-09 VITALS
WEIGHT: 196 LBS | DIASTOLIC BLOOD PRESSURE: 76 MMHG | HEART RATE: 76 BPM | TEMPERATURE: 95.2 F | OXYGEN SATURATION: 97 % | HEIGHT: 67 IN | BODY MASS INDEX: 30.76 KG/M2 | SYSTOLIC BLOOD PRESSURE: 164 MMHG

## 2023-03-09 DIAGNOSIS — K63.5 POLYP OF COLON: ICD-10-CM

## 2023-03-09 PROCEDURE — 99213 OFFICE O/P EST LOW 20 MIN: CPT

## 2023-03-09 RX ORDER — POLYETHYLENE GLYCOL-3350 AND ELECTROLYTES WITH FLAVOR PACK 240; 5.84; 2.98; 6.72; 22.72 G/278.26G; G/278.26G; G/278.26G; G/278.26G; G/278.26G
240 POWDER, FOR SOLUTION ORAL
Qty: 1 | Refills: 0 | Status: ACTIVE | COMMUNITY
Start: 2023-03-09 | End: 1900-01-01

## 2023-03-09 NOTE — PHYSICAL EXAM

## 2023-03-11 NOTE — HISTORY OF PRESENT ILLNESS
[FreeTextEntry1] : This is a 77 year old male here for a follow up post colonoscopy. He had a colonoscopy on 9/12/22 which revealed 3cm sessile tubular adenoma. EGD from 9/12/22 showed H-pylori s/p treatment. Denies any difficulty swallowing or reflux. No N&V or abdominal pain. Noticed intermittent rectal bleeding. No dark tarry stools. Blood only when he wipes himself. On average 1-2 episodes per week. No rectal pain or constipation. No other issues

## 2023-03-11 NOTE — ASSESSMENT
[FreeTextEntry1] : This is a 77 year old male here for a follow up post colonoscopy. \par Rectal bleeding similar to September 2022\par \par My plan \par Colonoscopy for surveillance\par Suprep\par Will consider capsule study if above is negative\par \par Risks, benefits, and alternatives of  colonoscopy discussed at length with patient including but not limited to bleeding , perforation, anesthesia related complication, aspiration, etc. Patient expressed understanding.\par \par Colonoscopy for evaluation of polyps, tumors, nodules with +/- biopsy and or cauterization w/ and or w/o clipping. \par

## 2023-03-17 ENCOUNTER — APPOINTMENT (OUTPATIENT)
Dept: VASCULAR SURGERY | Facility: CLINIC | Age: 78
End: 2023-03-17
Payer: MEDICARE

## 2023-03-17 PROCEDURE — 93924 LWR XTR VASC STDY BILAT: CPT

## 2023-03-17 PROCEDURE — 93970 EXTREMITY STUDY: CPT

## 2023-03-17 PROCEDURE — 99214 OFFICE O/P EST MOD 30 MIN: CPT

## 2023-03-19 NOTE — ASSESSMENT
[FreeTextEntry1] : Patient with bilateral lower extremity varicosities with 1 episode of bleeding.\par \par Patient with no significant arterial insufficiency based on arterial Dopplers.\par \par Patient has venous insufficiency with 1 episode of bleeding.  No further episodes since that first time.  Continue conservative management with compression stockings and elevation and follow-up closely.\par \par Risks benefits and alternative modes of treatment were all discussed with the patient in detail

## 2023-03-29 ENCOUNTER — RESULT REVIEW (OUTPATIENT)
Age: 78
End: 2023-03-29

## 2023-03-29 ENCOUNTER — APPOINTMENT (OUTPATIENT)
Dept: GASTROENTEROLOGY | Facility: HOSPITAL | Age: 78
End: 2023-03-29

## 2023-03-29 ENCOUNTER — TRANSCRIPTION ENCOUNTER (OUTPATIENT)
Age: 78
End: 2023-03-29

## 2023-03-29 ENCOUNTER — OUTPATIENT (OUTPATIENT)
Dept: OUTPATIENT SERVICES | Facility: HOSPITAL | Age: 78
LOS: 1 days | End: 2023-03-29
Payer: COMMERCIAL

## 2023-03-29 VITALS
HEART RATE: 86 BPM | WEIGHT: 190.04 LBS | OXYGEN SATURATION: 97 % | RESPIRATION RATE: 16 BRPM | HEIGHT: 67 IN | DIASTOLIC BLOOD PRESSURE: 78 MMHG | TEMPERATURE: 98 F | SYSTOLIC BLOOD PRESSURE: 139 MMHG

## 2023-03-29 VITALS
HEART RATE: 77 BPM | SYSTOLIC BLOOD PRESSURE: 125 MMHG | DIASTOLIC BLOOD PRESSURE: 73 MMHG | RESPIRATION RATE: 17 BRPM | OXYGEN SATURATION: 98 %

## 2023-03-29 DIAGNOSIS — K62.5 HEMORRHAGE OF ANUS AND RECTUM: ICD-10-CM

## 2023-03-29 DIAGNOSIS — Z98.890 OTHER SPECIFIED POSTPROCEDURAL STATES: Chronic | ICD-10-CM

## 2023-03-29 LAB — GLUCOSE BLDC GLUCOMTR-MCNC: 143 MG/DL — HIGH (ref 70–99)

## 2023-03-29 PROCEDURE — 82962 GLUCOSE BLOOD TEST: CPT

## 2023-03-29 PROCEDURE — 45385 COLONOSCOPY W/LESION REMOVAL: CPT

## 2023-03-29 PROCEDURE — 88305 TISSUE EXAM BY PATHOLOGIST: CPT

## 2023-03-29 PROCEDURE — 88305 TISSUE EXAM BY PATHOLOGIST: CPT | Mod: 26

## 2023-03-29 PROCEDURE — 45380 COLONOSCOPY AND BIOPSY: CPT | Mod: XS

## 2023-03-29 NOTE — ASU PATIENT PROFILE, ADULT - NSICDXPASTMEDICALHX_GEN_ALL_CORE_FT
PAST MEDICAL HISTORY:  Diabetes mellitus     H/O colonoscopy     H/O varicose veins     Hypertension     Neuropathy     Osteoarthritis

## 2023-03-31 ENCOUNTER — OUTPATIENT (OUTPATIENT)
Dept: OUTPATIENT SERVICES | Facility: HOSPITAL | Age: 78
LOS: 1 days | End: 2023-03-31
Payer: COMMERCIAL

## 2023-03-31 VITALS
SYSTOLIC BLOOD PRESSURE: 148 MMHG | HEART RATE: 85 BPM | RESPIRATION RATE: 14 BRPM | OXYGEN SATURATION: 97 % | WEIGHT: 186.95 LBS | DIASTOLIC BLOOD PRESSURE: 79 MMHG | HEIGHT: 67 IN | TEMPERATURE: 98 F

## 2023-03-31 DIAGNOSIS — M17.12 UNILATERAL PRIMARY OSTEOARTHRITIS, LEFT KNEE: ICD-10-CM

## 2023-03-31 DIAGNOSIS — Z98.890 OTHER SPECIFIED POSTPROCEDURAL STATES: Chronic | ICD-10-CM

## 2023-03-31 DIAGNOSIS — Z01.818 ENCOUNTER FOR OTHER PREPROCEDURAL EXAMINATION: ICD-10-CM

## 2023-03-31 LAB
A1C WITH ESTIMATED AVERAGE GLUCOSE RESULT: 7.2 % — HIGH (ref 4–5.6)
ALBUMIN SERPL ELPH-MCNC: 4.2 G/DL — SIGNIFICANT CHANGE UP (ref 3.3–5)
ALP SERPL-CCNC: 69 U/L — SIGNIFICANT CHANGE UP (ref 30–120)
ALT FLD-CCNC: 30 U/L DA — SIGNIFICANT CHANGE UP (ref 10–60)
ANION GAP SERPL CALC-SCNC: 8 MMOL/L — SIGNIFICANT CHANGE UP (ref 5–17)
APTT BLD: 42.3 SEC — HIGH (ref 27.5–35.5)
AST SERPL-CCNC: 25 U/L — SIGNIFICANT CHANGE UP (ref 10–40)
BILIRUB SERPL-MCNC: 0.8 MG/DL — SIGNIFICANT CHANGE UP (ref 0.2–1.2)
BLD GP AB SCN SERPL QL: SIGNIFICANT CHANGE UP
BUN SERPL-MCNC: 12 MG/DL — SIGNIFICANT CHANGE UP (ref 7–23)
CALCIUM SERPL-MCNC: 9.6 MG/DL — SIGNIFICANT CHANGE UP (ref 8.4–10.5)
CHLORIDE SERPL-SCNC: 103 MMOL/L — SIGNIFICANT CHANGE UP (ref 96–108)
CO2 SERPL-SCNC: 30 MMOL/L — SIGNIFICANT CHANGE UP (ref 22–31)
CREAT SERPL-MCNC: 0.86 MG/DL — SIGNIFICANT CHANGE UP (ref 0.5–1.3)
EGFR: 89 ML/MIN/1.73M2 — SIGNIFICANT CHANGE UP
ESTIMATED AVERAGE GLUCOSE: 160 MG/DL — HIGH (ref 68–114)
GLUCOSE SERPL-MCNC: 137 MG/DL — HIGH (ref 70–99)
HCT VFR BLD CALC: 40.7 % — SIGNIFICANT CHANGE UP (ref 39–50)
HGB BLD-MCNC: 14.1 G/DL — SIGNIFICANT CHANGE UP (ref 13–17)
INR BLD: 1.1 RATIO — SIGNIFICANT CHANGE UP (ref 0.88–1.16)
MCHC RBC-ENTMCNC: 29.7 PG — SIGNIFICANT CHANGE UP (ref 27–34)
MCHC RBC-ENTMCNC: 34.6 GM/DL — SIGNIFICANT CHANGE UP (ref 32–36)
MCV RBC AUTO: 85.9 FL — SIGNIFICANT CHANGE UP (ref 80–100)
MRSA PCR RESULT.: SIGNIFICANT CHANGE UP
NRBC # BLD: 0 /100 WBCS — SIGNIFICANT CHANGE UP (ref 0–0)
PLATELET # BLD AUTO: 203 K/UL — SIGNIFICANT CHANGE UP (ref 150–400)
POTASSIUM SERPL-MCNC: 4.4 MMOL/L — SIGNIFICANT CHANGE UP (ref 3.5–5.3)
POTASSIUM SERPL-SCNC: 4.4 MMOL/L — SIGNIFICANT CHANGE UP (ref 3.5–5.3)
PROT SERPL-MCNC: 7.6 G/DL — SIGNIFICANT CHANGE UP (ref 6–8.3)
PROTHROM AB SERPL-ACNC: 13 SEC — SIGNIFICANT CHANGE UP (ref 10.5–13.4)
RBC # BLD: 4.74 M/UL — SIGNIFICANT CHANGE UP (ref 4.2–5.8)
RBC # FLD: 13.2 % — SIGNIFICANT CHANGE UP (ref 10.3–14.5)
S AUREUS DNA NOSE QL NAA+PROBE: SIGNIFICANT CHANGE UP
SODIUM SERPL-SCNC: 141 MMOL/L — SIGNIFICANT CHANGE UP (ref 135–145)
SURGICAL PATHOLOGY STUDY: SIGNIFICANT CHANGE UP
WBC # BLD: 9.54 K/UL — SIGNIFICANT CHANGE UP (ref 3.8–10.5)
WBC # FLD AUTO: 9.54 K/UL — SIGNIFICANT CHANGE UP (ref 3.8–10.5)

## 2023-03-31 PROCEDURE — 93010 ELECTROCARDIOGRAM REPORT: CPT

## 2023-03-31 PROCEDURE — 93005 ELECTROCARDIOGRAM TRACING: CPT

## 2023-03-31 PROCEDURE — G0463: CPT

## 2023-03-31 RX ORDER — METFORMIN HYDROCHLORIDE 850 MG/1
1 TABLET ORAL
Refills: 0 | DISCHARGE

## 2023-03-31 RX ORDER — SITAGLIPTIN 50 MG/1
1 TABLET, FILM COATED ORAL
Refills: 0 | DISCHARGE

## 2023-03-31 RX ORDER — AMLODIPINE BESYLATE 2.5 MG/1
1 TABLET ORAL
Refills: 0 | DISCHARGE

## 2023-03-31 RX ORDER — LOSARTAN POTASSIUM 100 MG/1
1 TABLET, FILM COATED ORAL
Refills: 0 | DISCHARGE

## 2023-03-31 NOTE — H&P PST ADULT - NSANTHOSAYNRD_GEN_A_CORE
No. AWILDA screening performed.  STOP BANG Legend: 0-2 = LOW Risk; 3-4 = INTERMEDIATE Risk; 5-8 = HIGH Risk

## 2023-03-31 NOTE — H&P PST ADULT - NSICDXPASTMEDICALHX_GEN_ALL_CORE_FT
PAST MEDICAL HISTORY:  H/O colonoscopy     H/O varicose veins     Hypertension     Neuropathy     Osteoarthritis     Osteoarthritis of left knee     Type 2 diabetes mellitus     Venous insufficiency

## 2023-03-31 NOTE — H&P PST ADULT - WAS YOUR LAST COVID-19 VACCINE GREATER THAN OR EQUAL TO TWO MONTHS AGO?
Encounter addended by: Mimi Dockery, MATT, LADC on: 12/21/2022 5:06 PM   Actions taken: Clinical Note Signed
Yes

## 2023-03-31 NOTE — H&P PST ADULT - MUSCULOSKELETAL
no joint swelling/no joint erythema/no joint warmth/no calf tenderness/decreased ROM due to pain/extremities exam details…

## 2023-03-31 NOTE — H&P PST ADULT - PROBLEM SELECTOR PLAN 1
Left total knee replacement is planned for 4/11/2023  Covid testing 4/8/2023  Diagnostic testing performed  Medical and cardiac clearance requested  Patient has recently had vascular appointment and dopplers  Pre op instructions were reviewed including joint replacement protocol  best wishes offered

## 2023-04-05 ENCOUNTER — APPOINTMENT (OUTPATIENT)
Dept: ORTHOPEDIC SURGERY | Facility: CLINIC | Age: 78
End: 2023-04-05
Payer: MEDICARE

## 2023-04-05 VITALS — BODY MASS INDEX: 29.82 KG/M2 | WEIGHT: 190 LBS | HEIGHT: 67 IN

## 2023-04-05 LAB — APTT BLD: 37.2 SEC

## 2023-04-05 PROCEDURE — 99213 OFFICE O/P EST LOW 20 MIN: CPT

## 2023-04-05 NOTE — ADDENDUM
[FreeTextEntry1] : I, Don Petersen, acted solely as a scribe for Dr. Favian Marte MD on this date 04/05/2023  .\par  \par All medical record entries made by the Scribe were at my,  Dr. Favian Marte MD , direction and personally dictated by me on 04/05/2023 . I have reviewed the chart and agree that the record accurately reflects my personal performance of the history, physical exam, assessment and plan. I have also personally directed, reviewed, and agreed with the chart.

## 2023-04-05 NOTE — PHYSICAL EXAM
[de-identified] : Constitutional:Well nourished , well developed and in no acute distress\par Psychiatric: Alert and oriented to time place and person.Appropriate affect \par Skin:Head, neck, arms and lower extremities:no lesions or discoloration\par HEENT: Normocephalic, EOM intact, Nasal septum midline,\par Respiratory: Unlabored respirations,no audible wheezing ,no tachypnea, no cyanosis\par Cardiovascular: no leg swelling  no ankle edema no JVD, pulse regular\par Vascular: no calf or thigh tenderness, \par Peripheral pulses; intact\par Lymphatics:No groin adenopathy,no lymphedema lower  or upper extremities\par Left knee varicose veins pedal edema right and left ankles absent dorsal pedal posterior tibial pulses capillary refill intact range of motion 0/120 crepitus ligaments intact [de-identified] : Weightbearing x-rays left knee reveals medial compartment degenerative narrowing bone-on-bone subchondral sclerosis varus alignment lateral patellofemoral joint space narrowing and tilt

## 2023-04-05 NOTE — DISCUSSION/SUMMARY
[de-identified] : 77 year old male with end-stage osteoarthritis of the left knee.\par \par Plan patient with advanced degenerative arthritis left knee and therefore no longer a candidate for continued conservative management.  I recommended total knee replacement given patient's reported compromise of daily quality of life.  Risk benefits alternatives reviewed. \par

## 2023-04-05 NOTE — HISTORY OF PRESENT ILLNESS
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  He complains of spontaneous onset of chronic intermittent pain anteromedial aspect left knee provoked by walking but reports associated locking and possible swelling.  He is walk independently.  He feels quality life is markedly compromised because of left knee symptoms.  Has a history of right knee arthroscopy over 20 years ago.\par \par Patient saw vascular and was cleared, r/o peripheral vascular disease. Cleared vascular perspective for total knee replacement.

## 2023-04-07 ENCOUNTER — NON-APPOINTMENT (OUTPATIENT)
Age: 78
End: 2023-04-07

## 2023-05-02 PROBLEM — E11.9 TYPE 2 DIABETES MELLITUS WITHOUT COMPLICATIONS: Chronic | Status: ACTIVE | Noted: 2023-03-31

## 2023-05-02 PROBLEM — I87.2 VENOUS INSUFFICIENCY (CHRONIC) (PERIPHERAL): Chronic | Status: ACTIVE | Noted: 2023-03-31

## 2023-05-02 PROBLEM — Z86.79 PERSONAL HISTORY OF OTHER DISEASES OF THE CIRCULATORY SYSTEM: Chronic | Status: ACTIVE | Noted: 2023-03-29

## 2023-05-02 PROBLEM — M19.90 UNSPECIFIED OSTEOARTHRITIS, UNSPECIFIED SITE: Chronic | Status: ACTIVE | Noted: 2023-03-29

## 2023-05-02 PROBLEM — G62.9 POLYNEUROPATHY, UNSPECIFIED: Chronic | Status: ACTIVE | Noted: 2023-03-29

## 2023-05-02 PROBLEM — Z98.890 OTHER SPECIFIED POSTPROCEDURAL STATES: Chronic | Status: ACTIVE | Noted: 2023-03-29

## 2023-05-02 PROBLEM — M17.12 UNILATERAL PRIMARY OSTEOARTHRITIS, LEFT KNEE: Chronic | Status: ACTIVE | Noted: 2023-03-31

## 2023-10-27 ENCOUNTER — APPOINTMENT (OUTPATIENT)
Dept: VASCULAR SURGERY | Facility: CLINIC | Age: 78
End: 2023-10-27

## 2024-05-08 ENCOUNTER — APPOINTMENT (OUTPATIENT)
Dept: ORTHOPEDIC SURGERY | Facility: CLINIC | Age: 79
End: 2024-05-08
Payer: MEDICARE

## 2024-05-08 VITALS — BODY MASS INDEX: 29.03 KG/M2 | WEIGHT: 185 LBS | HEIGHT: 67 IN

## 2024-05-08 DIAGNOSIS — M17.12 UNILATERAL PRIMARY OSTEOARTHRITIS, LEFT KNEE: ICD-10-CM

## 2024-05-08 PROCEDURE — 73564 X-RAY EXAM KNEE 4 OR MORE: CPT | Mod: LT

## 2024-05-08 PROCEDURE — 99214 OFFICE O/P EST MOD 30 MIN: CPT

## 2024-05-08 NOTE — ADDENDUM
[FreeTextEntry1] : I, Anny Bueno, acted solely as a scribe for Dr. Favian Marte MD on this date  05/08/2024  All medical record entries made by the Scribe were at my, Dr. Favian Marte MD , direction and personally dictated by me on 05/08/2024. I have reviewed the chart and agree that the record accurately reflects my personal performance of the history, physical exam, assessment and plan. I have also personally directed, reviewed, and agreed with the chart.

## 2024-05-08 NOTE — HISTORY OF PRESENT ILLNESS
[de-identified] : ARTEMIO JIMENEZ is a 78 year old male who presents for follow up evaluation of left knee pain. Retired from maintenance.  He complains of spontaneous onset of chronic intermittent pain anteromedial aspect left knee provoked by walking more than a block but denies locking, swelling, giving out. He is walk independently.  He feels quality life is markedly compromised because of left knee symptoms.   Patient saw vascular and was cleared, r/o peripheral vascular disease. Cleared vascular perspective for total knee replacement. Has a history of right knee arthroscopy over 20 years ago. Patient states his A1C went from 9 to 8%.

## 2024-05-08 NOTE — DISCUSSION/SUMMARY
[de-identified] : 78 year old presents with end stage LEFT knee osteoarthritis with debilitating LEFT knee pain that is unresponsive to comprehensive conservative management and compromising quality of life. The conditions and treatment options have been discussed with the patient. Given X-rays, failure of prior conservative treatment including physical therapy, NSAIDs, cortisone injections and persistent pain at rest, the patient understands that I recommend total knee replacement as their best option for long term pain relief. We discussed total knee replacement, including risks, benefits and alternatives, and patient would like to proceed with surgery. I reviewed the plan of care as well as a model of a knee implant equivalent to the one that will be used for their total knee joint replacement. The patient participated in an interactive discussion of the TKR implant planned for their surgery with questions answered, agreed with the treatment plan, and has decided to move forward with elective TKR with Nick robotic assistance as planned. Implant brand choices were offered to the patient. The patient understands the risks and agrees to the plan of care as well as the use of implants for their total knee replacement. A pre-operative CT Scan of the LEFT knee will be ordered to assess bone loss or deformities.  Patient will be scheduled for LEFT total knee replacement. I advised patient the importance of getting his blood sugar levels under control prior to proceeding with surgery.   Patient will require a rolling walker as this is needed for activities of daily living within their home secondary to the diagnosis of osteoarthritis and post-surgical ambulation. A 3-in-1 commode for bedside use is also required, as the patient has no ability to access the bathroom in their home.  Dr. Favian Marte MD

## 2024-05-08 NOTE — PHYSICAL EXAM
[de-identified] : Constitutional: Well nourished , well developed and in no acute distress Psychiatric: Alert and oriented to time place and person.Appropriate affect . Skin: Head, neck, arms and lower extremities:no lesions or discoloration HEENT: Normocephalic, EOM intact, Nasal septum midline, Respiratory: Unlabored respirations,no audible wheezing ,no tachypnea, no cyanosis Cardiovascular: No leg swelling no ankle edema no JVD, pulse regular Vascular: No calf or thigh tenderness, Peripheral pulses: intact Lymphatics: No groin adenopathy,no lymphedema lower or upper extremities   o Right Knee Exam: Inspection/Palpation : no tenderness to palpation, no swelling, no deformity Range of Motion : 0 - 120 degrees, no crepitus Stability : no valgus or varus instability present on provocative testing, Lachmans Test (-) Motor Strength: Peroneals, EHL, and tibialis anterior 5/5 Reflexes: Patella 2+ R=L, Achilles 2+ R=L Sensation : sensation to pin intact Vascular Exam : no edema, no cyanosis, dorsalis pedis artery pulse 2+, posterior tibial artery pulse 2+ Skin : no erythema, no ecchymosis   o Left Knee Exam: Inspection/Palpation : no tenderness to palpation, no swelling, Vaus deformity Range of Motion : 0 - 120 degrees, no crepitus Stability : no valgus or varus instability present on provocative testing, Lachmans Test (-) Sensation : sensation to pin intact Vascular Exam : no edema, no cyanosis, dorsalis pedis artery pulse 1+, Tiara stasis pigmentation changes  Skin : no erythema, no ecchymosis [de-identified] : o 4 views of the  LEFT knee obtained today 05/08/2024 demonstrates medial compartment degenerative narrowing with bone on bone opposition subchondral sclerosis varus alignment lateral patellofemoral joint space narrowing and tilt ---------------------------------------------- Weightbearing x-rays left knee reveals medial compartment degenerative narrowing bone-on-bone subchondral sclerosis varus alignment lateral patellofemoral joint space narrowing and tilt

## 2024-07-01 ENCOUNTER — APPOINTMENT (OUTPATIENT)
Dept: VASCULAR SURGERY | Facility: CLINIC | Age: 79
End: 2024-07-01
Payer: MEDICARE

## 2024-07-01 PROCEDURE — 99214 OFFICE O/P EST MOD 30 MIN: CPT

## 2024-07-01 PROCEDURE — G2211 COMPLEX E/M VISIT ADD ON: CPT

## 2024-07-02 ENCOUNTER — NON-APPOINTMENT (OUTPATIENT)
Age: 79
End: 2024-07-02

## 2024-07-03 ENCOUNTER — APPOINTMENT (OUTPATIENT)
Dept: VASCULAR SURGERY | Facility: CLINIC | Age: 79
End: 2024-07-03
Payer: MEDICARE

## 2024-07-03 DIAGNOSIS — I83.893 VARICOSE VEINS OF BILATERAL LOWER EXTREMITIES WITH OTHER COMPLICATIONS: ICD-10-CM

## 2024-07-03 DIAGNOSIS — I87.2 VENOUS INSUFFICIENCY (CHRONIC) (PERIPHERAL): ICD-10-CM

## 2024-07-03 PROCEDURE — 93970 EXTREMITY STUDY: CPT

## 2024-07-17 ENCOUNTER — APPOINTMENT (OUTPATIENT)
Dept: ORTHOPEDIC SURGERY | Facility: CLINIC | Age: 79
End: 2024-07-17

## 2024-07-25 ENCOUNTER — APPOINTMENT (OUTPATIENT)
Dept: ORTHOPEDIC SURGERY | Facility: HOSPITAL | Age: 79
End: 2024-07-25

## 2024-08-09 ENCOUNTER — APPOINTMENT (OUTPATIENT)
Dept: ORTHOPEDIC SURGERY | Facility: CLINIC | Age: 79
End: 2024-08-09

## 2024-09-11 ENCOUNTER — APPOINTMENT (OUTPATIENT)
Dept: ORTHOPEDIC SURGERY | Facility: CLINIC | Age: 79
End: 2024-09-11
Payer: MEDICARE

## 2024-09-11 VITALS — HEIGHT: 67 IN | WEIGHT: 185 LBS | BODY MASS INDEX: 29.03 KG/M2

## 2024-09-11 DIAGNOSIS — M17.12 UNILATERAL PRIMARY OSTEOARTHRITIS, LEFT KNEE: ICD-10-CM

## 2024-09-11 PROCEDURE — 99214 OFFICE O/P EST MOD 30 MIN: CPT | Mod: 1L

## 2024-09-11 PROCEDURE — 99213 OFFICE O/P EST LOW 20 MIN: CPT

## 2024-09-11 NOTE — DISCUSSION/SUMMARY
[de-identified] : 78 year old presents with end stage LEFT knee osteoarthritis with debilitating LEFT knee pain that is unresponsive to comprehensive conservative management and compromising quality of life. The conditions and treatment options have been discussed with the patient. Given X-rays, failure of prior conservative treatment including physical therapy, NSAIDs, cortisone injections and persistent pain at rest, the patient understands that I recommend total knee replacement as their best option for long term pain relief. We discussed total knee replacement, including risks, benefits and alternatives, and patient would like to proceed with surgery. I reviewed the plan of care as well as a model of a knee implant equivalent to the one that will be used for their total knee joint replacement. The patient participated in an interactive discussion of the TKR implant planned for their surgery with questions answered, agreed with the treatment plan, and has decided to move forward with elective TKR with Nick robotic assistance as planned. Implant brand choices were offered to the patient. The patient understands the risks and agrees to the plan of care as well as the use of implants for their total knee replacement. A pre-operative CT Scan of the LEFT knee will be ordered to assess bone loss or deformities.  Patient will be scheduled for LEFT total knee replacement. Patient is to obtain clearance from his cardiologist prior to surgery. I instructed the patient the importance of getting his blood sugar levels under control prior to proceeding with surgery.  Patient will require a rolling walker as this is needed for activities of daily living within their home secondary to the diagnosis of osteoarthritis and post-surgical ambulation. A 3-in-1 commode for bedside use is also required, as the patient has no ability to access the bathroom in their home.  Dr. Favian Marte MD

## 2024-09-11 NOTE — PHYSICAL EXAM
[de-identified] : Constitutional: Well nourished , well developed and in no acute distress Psychiatric: Alert and oriented to time place and person.Appropriate affect . Skin: Head, neck, arms and lower extremities:no lesions or discoloration HEENT: Normocephalic, EOM intact, Nasal septum midline, Respiratory: Unlabored respirations,no audible wheezing ,no tachypnea, no cyanosis Cardiovascular: No leg swelling no ankle edema no JVD, pulse regular Vascular: No calf or thigh tenderness, Peripheral pulses: intact Lymphatics: No groin adenopathy,no lymphedema lower or upper extremities   o Right Knee Exam: Inspection/Palpation : no tenderness to palpation, no swelling, no deformity Range of Motion : 0 - 120 degrees, no crepitus Stability : no valgus or varus instability present on provocative testing, Lachmans Test (-) Motor Strength: Peroneals, EHL, and tibialis anterior 5/5 Reflexes: Patella 2+ R=L, Achilles 2+ R=L Sensation : sensation to pin intact Vascular Exam : no edema, no cyanosis, dorsalis pedis artery pulse 2+, posterior tibial artery pulse 2+ Skin : no erythema, no ecchymosis   o Left Knee Exam: Inspection/Palpation : no tenderness to palpation, no swelling, Varus deformity Range of Motion : 0 - 120 degrees, no crepitus Stability : no valgus or varus instability present on provocative testing, Lachmans Test (-) Sensation : sensation to pin intact Vascular Exam : no edema, no cyanosis, dorsalis pedis artery pulse 1+, Tiara stasis pigmentation changes  Skin : no erythema, no ecchymosis [de-identified] : o 4 views of the  LEFT knee obtained today 05/08/2024 demonstrates medial compartment degenerative narrowing with bone on bone opposition subchondral sclerosis varus alignment lateral patellofemoral joint space narrowing and tilt ---------------------------------------------- Weightbearing x-rays left knee reveals medial compartment degenerative narrowing bone-on-bone subchondral sclerosis varus alignment lateral patellofemoral joint space narrowing and tilt

## 2024-09-11 NOTE — DISCUSSION/SUMMARY
[de-identified] : 78 year old presents with end stage LEFT knee osteoarthritis with debilitating LEFT knee pain that is unresponsive to comprehensive conservative management and compromising quality of life. The conditions and treatment options have been discussed with the patient. Given X-rays, failure of prior conservative treatment including physical therapy, NSAIDs, cortisone injections and persistent pain at rest, the patient understands that I recommend total knee replacement as their best option for long term pain relief. We discussed total knee replacement, including risks, benefits and alternatives, and patient would like to proceed with surgery. I reviewed the plan of care as well as a model of a knee implant equivalent to the one that will be used for their total knee joint replacement. The patient participated in an interactive discussion of the TKR implant planned for their surgery with questions answered, agreed with the treatment plan, and has decided to move forward with elective TKR with Nick robotic assistance as planned. Implant brand choices were offered to the patient. The patient understands the risks and agrees to the plan of care as well as the use of implants for their total knee replacement. A pre-operative CT Scan of the LEFT knee will be ordered to assess bone loss or deformities.  Patient will be scheduled for LEFT total knee replacement. Patient is to obtain clearance from his cardiologist prior to surgery. I instructed the patient the importance of getting his blood sugar levels under control prior to proceeding with surgery.  Patient will require a rolling walker as this is needed for activities of daily living within their home secondary to the diagnosis of osteoarthritis and post-surgical ambulation. A 3-in-1 commode for bedside use is also required, as the patient has no ability to access the bathroom in their home.  Dr. Favian Marte MD

## 2024-09-11 NOTE — HISTORY OF PRESENT ILLNESS
[de-identified] : ARTEMIO JIMENEZ is a 78 year old male who presents for follow up evaluation of left knee pain. Retired from maintenance.  He complains of spontaneous onset of chronic intermittent pain anteromedial aspect left knee provoked by walking more than a block but denies locking, swelling, giving out. He is walk independently.  He feels quality life is markedly compromised because of left knee symptoms.  Since his last visit, he reports an aggravation of symptoms after walking a lot. Patient was scheduled for a total knee replacement, however, had to cancel due to a family members health issue. Patient is scheduled for a vascular procedure on October 11th. Patient saw vascular and was cleared, r/o peripheral vascular disease. Cleared vascular perspective for total knee replacement. Has a history of right knee arthroscopy over 20 years ago. Patient states his A1C went from 9 to 8% in May.

## 2024-09-11 NOTE — HISTORY OF PRESENT ILLNESS
[de-identified] : ARTEMIO JIMENEZ is a 78 year old male who presents for follow up evaluation of left knee pain. Retired from maintenance.  He complains of spontaneous onset of chronic intermittent pain anteromedial aspect left knee provoked by walking more than a block but denies locking, swelling, giving out. He is walk independently.  He feels quality life is markedly compromised because of left knee symptoms.  Since his last visit, he reports an aggravation of symptoms after walking a lot. Patient was scheduled for a total knee replacement, however, had to cancel due to a family members health issue. Patient is scheduled for a vascular procedure on October 11th. Patient saw vascular and was cleared, r/o peripheral vascular disease. Cleared vascular perspective for total knee replacement. Has a history of right knee arthroscopy over 20 years ago. Patient states his A1C went from 9 to 8% in May.

## 2024-09-11 NOTE — REVIEW OF SYSTEMS
----- Message from Mary Grubbs sent at 9/4/2020  8:57 AM EDT -----  Regarding: Dr. Radha Bullard / telephone  General Message/Vendor Calls    Caller's first and last name:   Braden Oliveira      Reason for call:     Yes - patient is not satisfied with services of the office and request in office visit      Callback required yes/no and why:   yes  to schedule all appts      Best contact number(s):   [phone]         Details to clarify the request: The last video consult was not completed. Patient would like to schedule an in office visit.      Wart  removal - procedure needed,   [id number] -08/31/20 -Dizziness  -follow up, tick bites over the course of summer - Limetiter test     Indy Anne [Negative] : Heme/Lymph

## 2024-09-11 NOTE — ADDENDUM
[FreeTextEntry1] : I, Anny Bueno, acted solely as a scribe for Dr. Favian Marte MD on this date  09/11/2024  All medical record entries made by the Scribe were at my, Dr. Favian Marte MD , direction and personally dictated by me on 09/11/2024. I have reviewed the chart and agree that the record accurately reflects my personal performance of the history, physical exam, assessment and plan. I have also personally directed, reviewed, and agreed with the chart.

## 2024-09-11 NOTE — PHYSICAL EXAM
[de-identified] : Constitutional: Well nourished , well developed and in no acute distress Psychiatric: Alert and oriented to time place and person.Appropriate affect . Skin: Head, neck, arms and lower extremities:no lesions or discoloration HEENT: Normocephalic, EOM intact, Nasal septum midline, Respiratory: Unlabored respirations,no audible wheezing ,no tachypnea, no cyanosis Cardiovascular: No leg swelling no ankle edema no JVD, pulse regular Vascular: No calf or thigh tenderness, Peripheral pulses: intact Lymphatics: No groin adenopathy,no lymphedema lower or upper extremities   o Right Knee Exam: Inspection/Palpation : no tenderness to palpation, no swelling, no deformity Range of Motion : 0 - 120 degrees, no crepitus Stability : no valgus or varus instability present on provocative testing, Lachmans Test (-) Motor Strength: Peroneals, EHL, and tibialis anterior 5/5 Reflexes: Patella 2+ R=L, Achilles 2+ R=L Sensation : sensation to pin intact Vascular Exam : no edema, no cyanosis, dorsalis pedis artery pulse 2+, posterior tibial artery pulse 2+ Skin : no erythema, no ecchymosis   o Left Knee Exam: Inspection/Palpation : no tenderness to palpation, no swelling, Varus deformity Range of Motion : 0 - 120 degrees, no crepitus Stability : no valgus or varus instability present on provocative testing, Lachmans Test (-) Sensation : sensation to pin intact Vascular Exam : no edema, no cyanosis, dorsalis pedis artery pulse 1+, Tiara stasis pigmentation changes  Skin : no erythema, no ecchymosis [de-identified] : o 4 views of the  LEFT knee obtained today 05/08/2024 demonstrates medial compartment degenerative narrowing with bone on bone opposition subchondral sclerosis varus alignment lateral patellofemoral joint space narrowing and tilt ---------------------------------------------- Weightbearing x-rays left knee reveals medial compartment degenerative narrowing bone-on-bone subchondral sclerosis varus alignment lateral patellofemoral joint space narrowing and tilt

## 2024-10-16 ENCOUNTER — APPOINTMENT (OUTPATIENT)
Dept: ENDOVASCULAR SURGERY | Facility: CLINIC | Age: 79
End: 2024-10-16
Payer: MEDICARE

## 2024-10-16 ENCOUNTER — LABORATORY RESULT (OUTPATIENT)
Age: 79
End: 2024-10-16

## 2024-10-16 VITALS
HEART RATE: 74 BPM | SYSTOLIC BLOOD PRESSURE: 151 MMHG | HEIGHT: 67 IN | WEIGHT: 180 LBS | BODY MASS INDEX: 28.25 KG/M2 | RESPIRATION RATE: 18 BRPM | TEMPERATURE: 98.2 F | DIASTOLIC BLOOD PRESSURE: 75 MMHG | OXYGEN SATURATION: 100 %

## 2024-10-16 DIAGNOSIS — I87.2 VENOUS INSUFFICIENCY (CHRONIC) (PERIPHERAL): ICD-10-CM

## 2024-10-16 PROCEDURE — 37765 STAB PHLEB VEINS XTR 10-20: CPT | Mod: LT

## 2024-10-16 PROCEDURE — 36475 ENDOVENOUS RF 1ST VEIN: CPT | Mod: LT

## 2024-10-16 RX ORDER — AMLODIPINE BESYLATE 5 MG/1
TABLET ORAL
Refills: 0 | Status: ACTIVE | COMMUNITY

## 2024-10-16 RX ORDER — DAPAGLIFLOZIN 10 MG/1
TABLET, FILM COATED ORAL
Refills: 0 | Status: ACTIVE | COMMUNITY

## 2024-10-16 RX ORDER — SITAGLIPTIN 100 MG/1
TABLET, FILM COATED ORAL
Refills: 0 | Status: ACTIVE | COMMUNITY

## 2024-10-16 RX ORDER — ZINC SULFATE 50(220)MG
CAPSULE ORAL
Refills: 0 | Status: ACTIVE | COMMUNITY

## 2024-10-16 RX ORDER — LOSARTAN POTASSIUM 100 MG/1
TABLET, FILM COATED ORAL
Refills: 0 | Status: ACTIVE | COMMUNITY

## 2024-10-18 ENCOUNTER — APPOINTMENT (OUTPATIENT)
Dept: VASCULAR SURGERY | Facility: CLINIC | Age: 79
End: 2024-10-18
Payer: MEDICARE

## 2024-10-18 PROCEDURE — 99024 POSTOP FOLLOW-UP VISIT: CPT

## 2024-10-18 PROCEDURE — 93971 EXTREMITY STUDY: CPT | Mod: LT

## 2024-11-01 ENCOUNTER — APPOINTMENT (OUTPATIENT)
Dept: VASCULAR SURGERY | Facility: CLINIC | Age: 79
End: 2024-11-01
Payer: MEDICARE

## 2024-11-01 VITALS
BODY MASS INDEX: 28.25 KG/M2 | DIASTOLIC BLOOD PRESSURE: 66 MMHG | HEIGHT: 67 IN | HEART RATE: 76 BPM | SYSTOLIC BLOOD PRESSURE: 139 MMHG | WEIGHT: 180 LBS

## 2024-11-01 PROCEDURE — 99213 OFFICE O/P EST LOW 20 MIN: CPT

## 2024-11-01 RX ORDER — AMOXICILLIN AND CLAVULANATE POTASSIUM 875; 125 MG/1; MG/1
875-125 TABLET, COATED ORAL
Qty: 20 | Refills: 0 | Status: ACTIVE | COMMUNITY
Start: 2024-11-01 | End: 1900-01-01

## 2024-11-08 ENCOUNTER — APPOINTMENT (OUTPATIENT)
Dept: VASCULAR SURGERY | Facility: CLINIC | Age: 79
End: 2024-11-08
Payer: MEDICARE

## 2024-11-08 PROCEDURE — G2211 COMPLEX E/M VISIT ADD ON: CPT

## 2024-11-08 PROCEDURE — 99213 OFFICE O/P EST LOW 20 MIN: CPT

## 2025-03-10 ENCOUNTER — APPOINTMENT (OUTPATIENT)
Dept: ORTHOPEDIC SURGERY | Facility: CLINIC | Age: 80
End: 2025-03-10
Payer: MEDICARE

## 2025-03-10 VITALS — BODY MASS INDEX: 30.22 KG/M2 | WEIGHT: 188 LBS | HEIGHT: 66 IN

## 2025-03-10 DIAGNOSIS — M17.12 UNILATERAL PRIMARY OSTEOARTHRITIS, LEFT KNEE: ICD-10-CM

## 2025-03-10 PROCEDURE — 99214 OFFICE O/P EST MOD 30 MIN: CPT

## 2025-03-14 ENCOUNTER — APPOINTMENT (OUTPATIENT)
Dept: VASCULAR SURGERY | Facility: CLINIC | Age: 80
End: 2025-03-14

## 2025-03-14 ENCOUNTER — NON-APPOINTMENT (OUTPATIENT)
Age: 80
End: 2025-03-14

## 2025-04-07 ENCOUNTER — OUTPATIENT (OUTPATIENT)
Dept: OUTPATIENT SERVICES | Facility: HOSPITAL | Age: 80
LOS: 1 days | End: 2025-04-07
Payer: COMMERCIAL

## 2025-04-07 VITALS
RESPIRATION RATE: 18 BRPM | HEART RATE: 78 BPM | WEIGHT: 179.02 LBS | TEMPERATURE: 98 F | HEIGHT: 67 IN | OXYGEN SATURATION: 98 % | SYSTOLIC BLOOD PRESSURE: 108 MMHG | DIASTOLIC BLOOD PRESSURE: 70 MMHG

## 2025-04-07 DIAGNOSIS — Z98.890 OTHER SPECIFIED POSTPROCEDURAL STATES: Chronic | ICD-10-CM

## 2025-04-07 DIAGNOSIS — Z01.818 ENCOUNTER FOR OTHER PREPROCEDURAL EXAMINATION: ICD-10-CM

## 2025-04-07 DIAGNOSIS — M17.12 UNILATERAL PRIMARY OSTEOARTHRITIS, LEFT KNEE: ICD-10-CM

## 2025-04-07 LAB
A1C WITH ESTIMATED AVERAGE GLUCOSE RESULT: 7.9 % — HIGH (ref 4–5.6)
ALBUMIN SERPL ELPH-MCNC: 4.3 G/DL — SIGNIFICANT CHANGE UP (ref 3.3–5)
ALP SERPL-CCNC: 84 U/L — SIGNIFICANT CHANGE UP (ref 30–120)
ALT FLD-CCNC: 23 U/L — SIGNIFICANT CHANGE UP (ref 10–60)
ANION GAP SERPL CALC-SCNC: 4 MMOL/L — LOW (ref 5–17)
APTT BLD: 43.7 SEC — HIGH (ref 24.5–35.6)
AST SERPL-CCNC: 20 U/L — SIGNIFICANT CHANGE UP (ref 10–40)
BILIRUB SERPL-MCNC: 0.9 MG/DL — SIGNIFICANT CHANGE UP (ref 0.2–1.2)
BLD GP AB SCN SERPL QL: SIGNIFICANT CHANGE UP
BUN SERPL-MCNC: 21 MG/DL — SIGNIFICANT CHANGE UP (ref 7–23)
CALCIUM SERPL-MCNC: 9.7 MG/DL — SIGNIFICANT CHANGE UP (ref 8.4–10.5)
CHLORIDE SERPL-SCNC: 102 MMOL/L — SIGNIFICANT CHANGE UP (ref 96–108)
CO2 SERPL-SCNC: 31 MMOL/L — SIGNIFICANT CHANGE UP (ref 22–31)
CREAT SERPL-MCNC: 0.97 MG/DL — SIGNIFICANT CHANGE UP (ref 0.5–1.3)
EGFR: 79 ML/MIN/1.73M2 — SIGNIFICANT CHANGE UP
EGFR: 79 ML/MIN/1.73M2 — SIGNIFICANT CHANGE UP
ESTIMATED AVERAGE GLUCOSE: 180 MG/DL — HIGH (ref 68–114)
GLUCOSE SERPL-MCNC: 174 MG/DL — HIGH (ref 70–99)
HCT VFR BLD CALC: 43.9 % — SIGNIFICANT CHANGE UP (ref 39–50)
HGB BLD-MCNC: 14.3 G/DL — SIGNIFICANT CHANGE UP (ref 13–17)
INR BLD: 1.03 RATIO — SIGNIFICANT CHANGE UP (ref 0.85–1.16)
MCHC RBC-ENTMCNC: 28.2 PG — SIGNIFICANT CHANGE UP (ref 27–34)
MCHC RBC-ENTMCNC: 32.6 G/DL — SIGNIFICANT CHANGE UP (ref 32–36)
MCV RBC AUTO: 86.6 FL — SIGNIFICANT CHANGE UP (ref 80–100)
NRBC BLD AUTO-RTO: 0 /100 WBCS — SIGNIFICANT CHANGE UP (ref 0–0)
PLATELET # BLD AUTO: 212 K/UL — SIGNIFICANT CHANGE UP (ref 150–400)
POTASSIUM SERPL-MCNC: 4.8 MMOL/L — SIGNIFICANT CHANGE UP (ref 3.5–5.3)
POTASSIUM SERPL-SCNC: 4.8 MMOL/L — SIGNIFICANT CHANGE UP (ref 3.5–5.3)
PROT SERPL-MCNC: 8.1 G/DL — SIGNIFICANT CHANGE UP (ref 6–8.3)
PROTHROM AB SERPL-ACNC: 12.1 SEC — SIGNIFICANT CHANGE UP (ref 9.9–13.4)
RBC # BLD: 5.07 M/UL — SIGNIFICANT CHANGE UP (ref 4.2–5.8)
RBC # FLD: 13.4 % — SIGNIFICANT CHANGE UP (ref 10.3–14.5)
SODIUM SERPL-SCNC: 137 MMOL/L — SIGNIFICANT CHANGE UP (ref 135–145)
WBC # BLD: 9.58 K/UL — SIGNIFICANT CHANGE UP (ref 3.8–10.5)
WBC # FLD AUTO: 9.58 K/UL — SIGNIFICANT CHANGE UP (ref 3.8–10.5)

## 2025-04-07 PROCEDURE — 86900 BLOOD TYPING SEROLOGIC ABO: CPT

## 2025-04-07 PROCEDURE — 93010 ELECTROCARDIOGRAM REPORT: CPT

## 2025-04-07 PROCEDURE — 83036 HEMOGLOBIN GLYCOSYLATED A1C: CPT

## 2025-04-07 PROCEDURE — 85730 THROMBOPLASTIN TIME PARTIAL: CPT

## 2025-04-07 PROCEDURE — 36415 COLL VENOUS BLD VENIPUNCTURE: CPT

## 2025-04-07 PROCEDURE — 86850 RBC ANTIBODY SCREEN: CPT

## 2025-04-07 PROCEDURE — 85610 PROTHROMBIN TIME: CPT

## 2025-04-07 PROCEDURE — 85027 COMPLETE CBC AUTOMATED: CPT

## 2025-04-07 PROCEDURE — 87640 STAPH A DNA AMP PROBE: CPT

## 2025-04-07 PROCEDURE — G0463: CPT

## 2025-04-07 PROCEDURE — 80053 COMPREHEN METABOLIC PANEL: CPT

## 2025-04-07 PROCEDURE — 93005 ELECTROCARDIOGRAM TRACING: CPT

## 2025-04-07 PROCEDURE — 86901 BLOOD TYPING SEROLOGIC RH(D): CPT

## 2025-04-07 PROCEDURE — 87641 MR-STAPH DNA AMP PROBE: CPT

## 2025-04-07 RX ORDER — DAPAGLIFLOZIN 5 MG/1
1 TABLET, FILM COATED ORAL
Refills: 0 | DISCHARGE

## 2025-04-07 RX ORDER — METFORMIN HYDROCHLORIDE 850 MG/1
2 TABLET ORAL
Refills: 0 | DISCHARGE

## 2025-04-07 RX ORDER — ATORVASTATIN CALCIUM 80 MG/1
1 TABLET, FILM COATED ORAL
Refills: 0 | DISCHARGE

## 2025-04-07 RX ORDER — HYDROCHLOROTHIAZIDE 50 MG/1
1 TABLET ORAL
Refills: 0 | DISCHARGE

## 2025-04-07 NOTE — H&P PST ADULT - HISTORY OF PRESENT ILLNESS
78 yo male reports left knee pain for which he has been treated with HA injections without relief.Pain worst with walking and stair climbing rating 7/10 He is scheduled for left total knee replacement 4/22/2025   78 yo male  with h/o HTN, DM type 2 reports left knee pain for which he has been treated with injections without relief. Pain worst with walking and stair climbing rating 7/10 He is scheduled for left total knee replacement 4/22/2025

## 2025-04-07 NOTE — H&P PST ADULT - NEUROLOGICAL
normal/cranial nerves II-XII intact/sensation intact details… normal/cranial nerves II-XII intact/sensation intact/responds to pain

## 2025-04-07 NOTE — H&P PST ADULT - COMMENTS
denies broken loose teeth, denies dentures  denies h/o DVT, PE  denies any current cold or flu like symptoms, including fever, cough, sinus congestion, body aches or chills

## 2025-04-07 NOTE — H&P PST ADULT - PROBLEM SELECTOR PLAN 1
Left total knee replacement is planned for   Covid testing 4/8/2023  Diagnostic testing performed  Medical and cardiac clearance requested  Patient has recently had vascular appointment and dopplers  Pre op instructions were reviewed including joint replacement protocol  best wishes offered Left total knee replacement on 4/22/2025  Diagnostic testing performed  Medical and cardiac , vascular clearance requested  Pre op instructions were reviewed including joint replacement protocol  DM  type 2 instructions provided, Patient and daughter verbalized understanding Left total knee replacement on 4/22/2025  Diagnostic testing performed  Medical and cardiac , vascular clearance requested  Pre op instructions were reviewed including joint replacement protocol  DM  type 2 instructions provided, Patient and daughter verbalized understanding  Patient provided with pre-operative instructions and verbalized understanding.    Patient will be NPO on day of surgery.   Patient will stop NSAIDs, aspirin, herbal supplements or vitamins 1 week prior to surgery.    Chlorhexidine wash and Gatorade  provided with instructions.

## 2025-04-07 NOTE — H&P PST ADULT - NSICDXPASTMEDICALHX_GEN_ALL_CORE_FT
PAST MEDICAL HISTORY:  Diabetes type 2     H/O colonoscopy     H/O varicose veins     Hypertension     Neuropathy     Osteoarthritis     Osteoarthritis of left knee     Type 2 diabetes mellitus     Venous insufficiency

## 2025-04-07 NOTE — H&P PST ADULT - MUSCULOSKELETAL
details… no joint swelling/no joint erythema/no joint warmth/no calf tenderness/decreased ROM due to pain/extremities exam left knee/no joint swelling/no joint erythema/no joint warmth/no calf tenderness/decreased ROM due to pain/extremities exam

## 2025-04-07 NOTE — H&P PST ADULT - EXTREMITIES COMMENTS
significant BLE varicosities status post ablation of left saphenous vein and stab phlebectomy of varicosities.

## 2025-04-07 NOTE — H&P PST ADULT - NSICDXPASTSURGICALHX_GEN_ALL_CORE_FT
PAST SURGICAL HISTORY:  H/O hernia repair     H/O knee surgery B/L    History of varicose vein procedure      PAST SURGICAL HISTORY:  H/O hernia repair     H/O knee surgery B/L    History of varicose vein procedure     Status post endovenous radiofrequency ablation (RFA) of saphenous vein

## 2025-04-08 LAB
MRSA PCR RESULT.: SIGNIFICANT CHANGE UP
S AUREUS DNA NOSE QL NAA+PROBE: SIGNIFICANT CHANGE UP

## 2025-04-09 ENCOUNTER — NON-APPOINTMENT (OUTPATIENT)
Age: 80
End: 2025-04-09

## 2025-04-11 ENCOUNTER — APPOINTMENT (OUTPATIENT)
Dept: VASCULAR SURGERY | Facility: CLINIC | Age: 80
End: 2025-04-11

## 2025-04-16 ENCOUNTER — APPOINTMENT (OUTPATIENT)
Dept: ORTHOPEDIC SURGERY | Facility: CLINIC | Age: 80
End: 2025-04-16

## 2025-04-22 ENCOUNTER — APPOINTMENT (OUTPATIENT)
Dept: ORTHOPEDIC SURGERY | Facility: HOSPITAL | Age: 80
End: 2025-04-22

## 2025-05-01 PROBLEM — E11.9 TYPE 2 DIABETES MELLITUS WITHOUT COMPLICATIONS: Chronic | Status: ACTIVE | Noted: 2025-04-07

## 2025-05-07 ENCOUNTER — APPOINTMENT (OUTPATIENT)
Dept: ORTHOPEDIC SURGERY | Facility: CLINIC | Age: 80
End: 2025-05-07

## 2025-06-13 ENCOUNTER — APPOINTMENT (OUTPATIENT)
Dept: VASCULAR SURGERY | Facility: CLINIC | Age: 80
End: 2025-06-13

## 2025-06-30 ENCOUNTER — APPOINTMENT (OUTPATIENT)
Dept: VASCULAR SURGERY | Facility: CLINIC | Age: 80
End: 2025-06-30
Payer: MEDICARE

## 2025-06-30 VITALS
SYSTOLIC BLOOD PRESSURE: 143 MMHG | HEIGHT: 66 IN | BODY MASS INDEX: 30.22 KG/M2 | HEART RATE: 75 BPM | WEIGHT: 188 LBS | DIASTOLIC BLOOD PRESSURE: 78 MMHG

## 2025-06-30 PROCEDURE — 93924 LWR XTR VASC STDY BILAT: CPT

## 2025-06-30 PROCEDURE — 99215 OFFICE O/P EST HI 40 MIN: CPT

## 2025-06-30 RX ORDER — MELATONIN 2.5MG/10ML
600 LIQUID (ML) ORAL
Refills: 0 | Status: ACTIVE | COMMUNITY

## 2025-06-30 RX ORDER — ATORVASTATIN CALCIUM 20 MG/1
20 TABLET, FILM COATED ORAL
Refills: 0 | Status: ACTIVE | COMMUNITY

## 2025-07-08 NOTE — ASU PATIENT PROFILE, ADULT - PATIENT'S GENDER IDENTITY
Reached out to father leatha Zarco with a  (Staff : Thao Caraballo) to schedule a Hematology referral.  Dad was available to schedule and took the next opening on 8/20/25 with Malik Solomon.  Location was confirmed, and direct contact info provided if needed.  I will also mail out a new patient letter with map as well.  
Male

## 2025-08-06 ENCOUNTER — APPOINTMENT (OUTPATIENT)
Dept: VASCULAR SURGERY | Facility: CLINIC | Age: 80
End: 2025-08-06
Payer: MEDICARE

## 2025-08-06 PROCEDURE — 93971 EXTREMITY STUDY: CPT | Mod: RT

## (undated) DEVICE — TUBING MEDI-VAC W MAXIGRIP CONNECTORS 1/4"X6'

## (undated) DEVICE — KIT ENDO PROCEDURE CUST W/VLV

## (undated) DEVICE — TUBING IV SET GRAVITY 3Y 100" MACRO

## (undated) DEVICE — NDL INJ SCLERO INTERJECT 23G

## (undated) DEVICE — SNARE CAPTIVATOR RND COLD STIFF 2.4X10MM 240CM

## (undated) DEVICE — SENSOR O2 FINGER ADULT

## (undated) DEVICE — RADIAL JAW 4 LG CAPACITY WITH NDL

## (undated) DEVICE — PROBE FIAPC DIA 2.3MM/7FR LNTH 220CM/7.2FT

## (undated) DEVICE — CATH ELCTR GLIDE PRB 7FR

## (undated) DEVICE — VENODYNE/SCD SLEEVE CALF MEDIUM

## (undated) DEVICE — RETRIEVER ROTH NET PLATINUM-UNIVERSAL

## (undated) DEVICE — CLAMP BX HOT RAD JAW 3

## (undated) DEVICE — BITE BLOCK ADULT 20 X 27MM (GREEN)

## (undated) DEVICE — FORCEP BIOPSY 2.5MM DISP

## (undated) DEVICE — LUBRICATING JELLY ONESHOT 1.25OZ

## (undated) DEVICE — TUBING CANNULA SALTER LABS NASAL ADULT 7FT

## (undated) DEVICE — SOL INJ NS 0.9% 500ML 1-PORT

## (undated) DEVICE — SNARE LOOP POLY DISP 30MM LOOP

## (undated) DEVICE — TRAP SPECIMEN SPUTUM 40CC

## (undated) DEVICE — SNARE CAPTIVATOR RND COLD STIFF 10X2.8MM

## (undated) DEVICE — ADAPTER ENDO CHNL SINGLE USE

## (undated) DEVICE — SYR LUER LOK 50CC

## (undated) DEVICE — MASK OXYGEN PANORAMIC

## (undated) DEVICE — DRSG CURITY GAUZE SPONGE 4 X 4" 12-PLY

## (undated) DEVICE — PLATE NESSY 170

## (undated) DEVICE — FORMALIN CUPS 10% BUFFERED

## (undated) DEVICE — STERIS DEFENDO 3-PIECE KIT (AIR/WATER, SUCTION & BIOPSY VALVES)